# Patient Record
Sex: FEMALE | Race: WHITE | NOT HISPANIC OR LATINO | URBAN - METROPOLITAN AREA
[De-identification: names, ages, dates, MRNs, and addresses within clinical notes are randomized per-mention and may not be internally consistent; named-entity substitution may affect disease eponyms.]

---

## 2017-01-03 ENCOUNTER — OUTPATIENT (OUTPATIENT)
Dept: OUTPATIENT SERVICES | Facility: HOSPITAL | Age: 72
LOS: 1 days | Discharge: HOME | End: 2017-01-03

## 2017-06-27 DIAGNOSIS — Z12.31 ENCOUNTER FOR SCREENING MAMMOGRAM FOR MALIGNANT NEOPLASM OF BREAST: ICD-10-CM

## 2017-07-10 ENCOUNTER — OUTPATIENT (OUTPATIENT)
Dept: OUTPATIENT SERVICES | Facility: HOSPITAL | Age: 72
LOS: 1 days | Discharge: HOME | End: 2017-07-10

## 2017-07-14 ENCOUNTER — OUTPATIENT (OUTPATIENT)
Dept: OUTPATIENT SERVICES | Facility: HOSPITAL | Age: 72
LOS: 1 days | Discharge: HOME | End: 2017-07-14

## 2017-07-14 DIAGNOSIS — R10.2 PELVIC AND PERINEAL PAIN: ICD-10-CM

## 2018-01-18 ENCOUNTER — OUTPATIENT (OUTPATIENT)
Dept: OUTPATIENT SERVICES | Facility: HOSPITAL | Age: 73
LOS: 1 days | Discharge: HOME | End: 2018-01-18

## 2018-01-18 DIAGNOSIS — Z12.31 ENCOUNTER FOR SCREENING MAMMOGRAM FOR MALIGNANT NEOPLASM OF BREAST: ICD-10-CM

## 2018-07-24 ENCOUNTER — APPOINTMENT (OUTPATIENT)
Dept: OBGYN | Facility: CLINIC | Age: 73
End: 2018-07-24
Payer: MEDICARE

## 2018-07-24 ENCOUNTER — OUTPATIENT (OUTPATIENT)
Dept: OUTPATIENT SERVICES | Facility: HOSPITAL | Age: 73
LOS: 1 days | Discharge: HOME | End: 2018-07-24

## 2018-07-24 VITALS
DIASTOLIC BLOOD PRESSURE: 70 MMHG | HEIGHT: 62 IN | BODY MASS INDEX: 25.4 KG/M2 | SYSTOLIC BLOOD PRESSURE: 130 MMHG | WEIGHT: 138 LBS

## 2018-07-24 DIAGNOSIS — L65.9 NONSCARRING HAIR LOSS, UNSPECIFIED: ICD-10-CM

## 2018-07-24 DIAGNOSIS — Z01.419 ENCOUNTER FOR GYNECOLOGICAL EXAMINATION (GENERAL) (ROUTINE) WITHOUT ABNORMAL FINDINGS: ICD-10-CM

## 2018-07-24 DIAGNOSIS — Z80.0 FAMILY HISTORY OF MALIGNANT NEOPLASM OF DIGESTIVE ORGANS: ICD-10-CM

## 2018-07-24 DIAGNOSIS — Z87.39 PERSONAL HISTORY OF OTHER DISEASES OF THE MUSCULOSKELETAL SYSTEM AND CONNECTIVE TISSUE: ICD-10-CM

## 2018-07-24 DIAGNOSIS — K52.9 NONINFECTIVE GASTROENTERITIS AND COLITIS, UNSPECIFIED: ICD-10-CM

## 2018-07-24 DIAGNOSIS — Z85.828 PERSONAL HISTORY OF OTHER MALIGNANT NEOPLASM OF SKIN: ICD-10-CM

## 2018-07-24 LAB
BILIRUB UR QL STRIP: NORMAL
GLUCOSE UR-MCNC: NORMAL
HCG UR QL: 0.2 EU/DL
HGB UR QL STRIP.AUTO: NORMAL
KETONES UR-MCNC: NORMAL
LEUKOCYTE ESTERASE UR QL STRIP: NORMAL
NITRITE UR QL STRIP: NORMAL
PH UR STRIP: 6
PROT UR STRIP-MCNC: NORMAL
SP GR UR STRIP: 1.02

## 2018-07-24 PROCEDURE — 81003 URINALYSIS AUTO W/O SCOPE: CPT | Mod: QW

## 2018-07-24 PROCEDURE — G0101: CPT

## 2018-07-28 LAB — HPV HIGH+LOW RISK DNA PNL CVX: NOT DETECTED

## 2018-08-27 ENCOUNTER — APPOINTMENT (OUTPATIENT)
Dept: OBGYN | Facility: CLINIC | Age: 73
End: 2018-08-27
Payer: MEDICARE

## 2018-08-27 PROCEDURE — 77085 DXA BONE DENSITY AXL VRT FX: CPT

## 2018-09-04 ENCOUNTER — RESULT REVIEW (OUTPATIENT)
Age: 73
End: 2018-09-04

## 2019-02-03 ENCOUNTER — FORM ENCOUNTER (OUTPATIENT)
Age: 74
End: 2019-02-03

## 2019-02-04 ENCOUNTER — OUTPATIENT (OUTPATIENT)
Dept: OUTPATIENT SERVICES | Facility: HOSPITAL | Age: 74
LOS: 1 days | Discharge: HOME | End: 2019-02-04

## 2019-02-04 DIAGNOSIS — Z12.31 ENCOUNTER FOR SCREENING MAMMOGRAM FOR MALIGNANT NEOPLASM OF BREAST: ICD-10-CM

## 2019-09-09 ENCOUNTER — APPOINTMENT (OUTPATIENT)
Dept: OBGYN | Facility: CLINIC | Age: 74
End: 2019-09-09
Payer: MEDICARE

## 2019-09-09 VITALS — BODY MASS INDEX: 25.06 KG/M2 | DIASTOLIC BLOOD PRESSURE: 80 MMHG | WEIGHT: 137 LBS | SYSTOLIC BLOOD PRESSURE: 120 MMHG

## 2019-09-09 LAB
BILIRUB UR QL STRIP: NORMAL
GLUCOSE UR-MCNC: NORMAL
HCG UR QL: NORMAL EU/DL
HGB UR QL STRIP.AUTO: NORMAL
KETONES UR-MCNC: NORMAL
LEUKOCYTE ESTERASE UR QL STRIP: 75
NITRITE UR QL STRIP: NORMAL
PH UR STRIP: 5
PROT UR STRIP-MCNC: NORMAL
SP GR UR STRIP: 1.03

## 2019-09-09 PROCEDURE — 99213 OFFICE O/P EST LOW 20 MIN: CPT

## 2019-09-09 PROCEDURE — 81003 URINALYSIS AUTO W/O SCOPE: CPT | Mod: QW

## 2019-09-09 RX ORDER — DUTASTERIDE 0.5 MG/1
0.5 CAPSULE, LIQUID FILLED ORAL
Refills: 0 | Status: COMPLETED | COMMUNITY
End: 2019-09-09

## 2019-09-09 NOTE — PHYSICAL EXAM
[Awake] : awake [Alert] : alert [Soft] : soft [Oriented x3] : oriented to person, place, and time [Normal] : uterus [No Bleeding] : there was no active vaginal bleeding [Uterine Adnexae] : were not tender and not enlarged [Acute Distress] : no acute distress [Thyroid Nodule] : no thyroid nodule [LAD] : no lymphadenopathy [Mass] : no breast mass [Goiter] : no goiter [Nipple Discharge] : no nipple discharge [Axillary LAD] : no axillary lymphadenopathy [Tender] : non tender [Distended] : not distended [Depressed Mood] : not depressed [Atrophy] : atrophy [RRR, No Murmurs] : RRR, no murmurs [CTAB] : CTAB

## 2019-10-14 ENCOUNTER — APPOINTMENT (OUTPATIENT)
Dept: CARDIOLOGY | Facility: CLINIC | Age: 74
End: 2019-10-14
Payer: MEDICARE

## 2019-10-14 PROCEDURE — 93000 ELECTROCARDIOGRAM COMPLETE: CPT

## 2019-10-14 PROCEDURE — 99213 OFFICE O/P EST LOW 20 MIN: CPT

## 2019-11-04 ENCOUNTER — OUTPATIENT (OUTPATIENT)
Dept: OUTPATIENT SERVICES | Facility: HOSPITAL | Age: 74
LOS: 1 days | Discharge: HOME | End: 2019-11-04

## 2019-11-04 ENCOUNTER — TRANSCRIPTION ENCOUNTER (OUTPATIENT)
Age: 74
End: 2019-11-04

## 2019-11-04 VITALS — HEIGHT: 62 IN | WEIGHT: 134.92 LBS

## 2019-11-04 VITALS — SYSTOLIC BLOOD PRESSURE: 124 MMHG | DIASTOLIC BLOOD PRESSURE: 62 MMHG | RESPIRATION RATE: 18 BRPM | HEART RATE: 56 BPM

## 2019-11-04 DIAGNOSIS — Z90.49 ACQUIRED ABSENCE OF OTHER SPECIFIED PARTS OF DIGESTIVE TRACT: Chronic | ICD-10-CM

## 2019-11-04 DIAGNOSIS — Z98.891 HISTORY OF UTERINE SCAR FROM PREVIOUS SURGERY: Chronic | ICD-10-CM

## 2019-11-08 DIAGNOSIS — K64.8 OTHER HEMORRHOIDS: ICD-10-CM

## 2019-11-08 DIAGNOSIS — Z12.11 ENCOUNTER FOR SCREENING FOR MALIGNANT NEOPLASM OF COLON: ICD-10-CM

## 2019-11-08 DIAGNOSIS — Z88.2 ALLERGY STATUS TO SULFONAMIDES: ICD-10-CM

## 2019-11-08 DIAGNOSIS — Z88.1 ALLERGY STATUS TO OTHER ANTIBIOTIC AGENTS STATUS: ICD-10-CM

## 2019-11-08 DIAGNOSIS — K57.30 DIVERTICULOSIS OF LARGE INTESTINE WITHOUT PERFORATION OR ABSCESS WITHOUT BLEEDING: ICD-10-CM

## 2019-11-11 PROBLEM — I47.1 SUPRAVENTRICULAR TACHYCARDIA: Chronic | Status: ACTIVE | Noted: 2019-11-04

## 2019-12-06 ENCOUNTER — OUTPATIENT (OUTPATIENT)
Dept: OUTPATIENT SERVICES | Facility: HOSPITAL | Age: 74
LOS: 1 days | Discharge: HOME | End: 2019-12-06

## 2019-12-06 VITALS
OXYGEN SATURATION: 98 % | SYSTOLIC BLOOD PRESSURE: 161 MMHG | HEART RATE: 49 BPM | TEMPERATURE: 98 F | DIASTOLIC BLOOD PRESSURE: 67 MMHG | WEIGHT: 136.03 LBS | RESPIRATION RATE: 18 BRPM | HEIGHT: 62 IN

## 2019-12-06 VITALS
HEART RATE: 54 BPM | OXYGEN SATURATION: 99 % | SYSTOLIC BLOOD PRESSURE: 152 MMHG | RESPIRATION RATE: 18 BRPM | DIASTOLIC BLOOD PRESSURE: 68 MMHG

## 2019-12-06 DIAGNOSIS — Z98.891 HISTORY OF UTERINE SCAR FROM PREVIOUS SURGERY: Chronic | ICD-10-CM

## 2019-12-06 DIAGNOSIS — Z90.49 ACQUIRED ABSENCE OF OTHER SPECIFIED PARTS OF DIGESTIVE TRACT: Chronic | ICD-10-CM

## 2019-12-06 NOTE — ASU DISCHARGE PLAN (ADULT/PEDIATRIC) - PATIENT EDUCATION MATERIALS PROVIED
Provider pre-printed instructions given/This discharge document not given to pt a/p Dr Reilly.  Please see paper chart for pre-printed discharge instructions a/p Dr Reilly.

## 2019-12-10 DIAGNOSIS — Z88.8 ALLERGY STATUS TO OTHER DRUGS, MEDICAMENTS AND BIOLOGICAL SUBSTANCES: ICD-10-CM

## 2019-12-10 DIAGNOSIS — Z88.2 ALLERGY STATUS TO SULFONAMIDES: ICD-10-CM

## 2019-12-10 DIAGNOSIS — H25.11 AGE-RELATED NUCLEAR CATARACT, RIGHT EYE: ICD-10-CM

## 2019-12-10 DIAGNOSIS — H52.201 UNSPECIFIED ASTIGMATISM, RIGHT EYE: ICD-10-CM

## 2019-12-10 DIAGNOSIS — Z79.82 LONG TERM (CURRENT) USE OF ASPIRIN: ICD-10-CM

## 2019-12-13 ENCOUNTER — OUTPATIENT (OUTPATIENT)
Dept: OUTPATIENT SERVICES | Facility: HOSPITAL | Age: 74
LOS: 1 days | Discharge: HOME | End: 2019-12-13

## 2019-12-13 VITALS
RESPIRATION RATE: 17 BRPM | SYSTOLIC BLOOD PRESSURE: 168 MMHG | HEART RATE: 58 BPM | DIASTOLIC BLOOD PRESSURE: 65 MMHG | OXYGEN SATURATION: 99 %

## 2019-12-13 VITALS
RESPIRATION RATE: 17 BRPM | DIASTOLIC BLOOD PRESSURE: 63 MMHG | WEIGHT: 134.92 LBS | TEMPERATURE: 96 F | HEART RATE: 58 BPM | HEIGHT: 62 IN | SYSTOLIC BLOOD PRESSURE: 169 MMHG | OXYGEN SATURATION: 97 %

## 2019-12-13 DIAGNOSIS — Z98.891 HISTORY OF UTERINE SCAR FROM PREVIOUS SURGERY: Chronic | ICD-10-CM

## 2019-12-13 DIAGNOSIS — Z90.49 ACQUIRED ABSENCE OF OTHER SPECIFIED PARTS OF DIGESTIVE TRACT: Chronic | ICD-10-CM

## 2019-12-13 DIAGNOSIS — Z96.1 PRESENCE OF INTRAOCULAR LENS: Chronic | ICD-10-CM

## 2019-12-13 RX ORDER — ACETAMINOPHEN 500 MG
500 TABLET ORAL ONCE
Refills: 0 | Status: DISCONTINUED | OUTPATIENT
Start: 2019-12-13 | End: 2019-12-28

## 2019-12-13 RX ORDER — ONDANSETRON 8 MG/1
4 TABLET, FILM COATED ORAL ONCE
Refills: 0 | Status: DISCONTINUED | OUTPATIENT
Start: 2019-12-13 | End: 2019-12-28

## 2019-12-20 DIAGNOSIS — I47.1 SUPRAVENTRICULAR TACHYCARDIA: ICD-10-CM

## 2019-12-20 DIAGNOSIS — Z88.1 ALLERGY STATUS TO OTHER ANTIBIOTIC AGENTS STATUS: ICD-10-CM

## 2019-12-20 DIAGNOSIS — Z88.2 ALLERGY STATUS TO SULFONAMIDES: ICD-10-CM

## 2019-12-20 DIAGNOSIS — Z90.49 ACQUIRED ABSENCE OF OTHER SPECIFIED PARTS OF DIGESTIVE TRACT: ICD-10-CM

## 2019-12-20 DIAGNOSIS — H25.89 OTHER AGE-RELATED CATARACT: ICD-10-CM

## 2020-01-20 NOTE — ASU PREOP CHECKLIST - TO WHOM
----- Message from Araceli Reeves MD sent at 1/20/2020  4:30 PM CST -----  Still waiting to see if cx done   kosta mauro

## 2020-06-10 ENCOUNTER — RESULT REVIEW (OUTPATIENT)
Age: 75
End: 2020-06-10

## 2020-06-10 ENCOUNTER — OUTPATIENT (OUTPATIENT)
Dept: OUTPATIENT SERVICES | Facility: HOSPITAL | Age: 75
LOS: 1 days | Discharge: HOME | End: 2020-06-10
Payer: MEDICARE

## 2020-06-10 DIAGNOSIS — Z90.49 ACQUIRED ABSENCE OF OTHER SPECIFIED PARTS OF DIGESTIVE TRACT: Chronic | ICD-10-CM

## 2020-06-10 DIAGNOSIS — Z98.891 HISTORY OF UTERINE SCAR FROM PREVIOUS SURGERY: Chronic | ICD-10-CM

## 2020-06-10 DIAGNOSIS — Z12.31 ENCOUNTER FOR SCREENING MAMMOGRAM FOR MALIGNANT NEOPLASM OF BREAST: ICD-10-CM

## 2020-06-10 DIAGNOSIS — Z96.1 PRESENCE OF INTRAOCULAR LENS: Chronic | ICD-10-CM

## 2020-06-10 PROCEDURE — 77063 BREAST TOMOSYNTHESIS BI: CPT | Mod: 26

## 2020-06-10 PROCEDURE — 77067 SCR MAMMO BI INCL CAD: CPT | Mod: 26

## 2020-09-16 ENCOUNTER — RECORD ABSTRACTING (OUTPATIENT)
Age: 75
End: 2020-09-16

## 2020-09-16 DIAGNOSIS — Z87.891 PERSONAL HISTORY OF NICOTINE DEPENDENCE: ICD-10-CM

## 2020-09-16 DIAGNOSIS — R07.89 OTHER CHEST PAIN: ICD-10-CM

## 2020-09-16 NOTE — ASU PATIENT PROFILE, ADULT - URINARY CATHETER
Subjective:      Patient ID: Iza Maurice is a 36 y.o. female. Abdominal Pain   This is a new problem. The current episode started yesterday. The onset quality is gradual. The problem occurs intermittently. The problem has been gradually worsening. The pain is located in the RLQ and suprapubic region. The pain is at a severity of 4/10. The pain is moderate. The quality of the pain is aching. The abdominal pain radiates to the back. Associated symptoms include frequency. Nothing aggravates the pain. The pain is relieved by nothing. feels like she when she had uti last time last year  No burning or urgency  Just finished period    Review of Systems   Constitutional: Positive for activity change and fatigue. Gastrointestinal: Positive for abdominal pain. Genitourinary: Positive for frequency. YOB: 1989    Date of Visit:  9/16/2020    No Known Allergies    Outpatient Medications Marked as Taking for the 9/16/20 encounter (Office Visit) with Tori López, DO   Medication Sig Dispense Refill    amoxicillin (AMOXIL) 500 MG capsule Take 1 capsule by mouth 3 times daily for 7 days 21 capsule 0    diclofenac (VOLTAREN) 50 MG EC tablet Take 1 tablet by mouth 3 times daily as needed for Pain 21 tablet 0    desvenlafaxine succinate (PRISTIQ) 50 MG TB24 extended release tablet Take 1 tablet by mouth daily 30 tablet 3    desvenlafaxine succinate (PRISTIQ) 25 MG TB24 extended release tablet Take 1 tablet by mouth daily 30 tablet 2    B Complex-Folic Acid (B COMPLEX PLUS PO)       Omega-3 Fatty Acids (FISH OIL) 1000 MG CAPS       etonogestrel-ethinyl estradiol (NUVARING) 0.12-0.015 MG/24HR vaginal ring insert 1 vaginal ring by vaginal route once a month leave in place for 3 weeks, remove for 1 week         Vitals:    09/16/20 1350   BP: 124/74   Weight: 144 lb (65.3 kg)   Height: 5' 6\" (1.676 m)     Body mass index is 23.24 kg/m².      Wt Readings from Last 3 Encounters:   09/16/20 144 lb (65.3 kg)   07/13/20 147 lb 12.8 oz (67 kg)   01/24/20 163 lb (73.9 kg)     BP Readings from Last 3 Encounters:   09/16/20 124/74   07/13/20 139/75   01/24/20 112/80         Objective:   Physical Exam  Vitals signs and nursing note reviewed. Constitutional:       Appearance: She is well-developed. HENT:      Head: Normocephalic. Neck:      Thyroid: No thyromegaly. Cardiovascular:      Rate and Rhythm: Normal rate and regular rhythm. Heart sounds: Normal heart sounds. Pulmonary:      Effort: Pulmonary effort is normal.      Breath sounds: Normal breath sounds. Abdominal:      General: There is no distension. Palpations: Abdomen is soft. There is no mass. Tenderness: There is abdominal tenderness (diffuse right lower quad and suprapubic). There is no guarding or rebound. Lymphadenopathy:      Cervical: No cervical adenopathy. Skin:     General: Skin is warm and dry. Neurological:      Mental Status: She is alert and oriented to person, place, and time. Psychiatric:         Behavior: Behavior normal.         Thought Content: Thought content normal.         Judgment: Judgment normal.         Assessment:      Assessment/plan;  Edward Roque was seen today for abdominal pain. Diagnoses and all orders for this visit:    Acute cystitis without hematuria    YAW (generalized anxiety disorder)  -     desvenlafaxine succinate (PRISTIQ) 25 MG TB24 extended release tablet; Take 1 tablet by mouth daily    Other orders  -     amoxicillin (AMOXIL) 500 MG capsule; Take 1 capsule by mouth 3 times daily for 7 days  -     diclofenac (VOLTAREN) 50 MG EC tablet; Take 1 tablet by mouth 3 times daily as needed for Pain  -     desvenlafaxine succinate (PRISTIQ) 50 MG TB24 extended release tablet;  Take 1 tablet by mouth daily    discussed appendicitis and symptoms  Will go to er if increasing pain, nausea, fever or chills   Mary Daley,  no

## 2020-10-06 ENCOUNTER — APPOINTMENT (OUTPATIENT)
Dept: OBGYN | Facility: CLINIC | Age: 75
End: 2020-10-06
Payer: MEDICARE

## 2020-10-06 ENCOUNTER — RESULT CHARGE (OUTPATIENT)
Age: 75
End: 2020-10-06

## 2020-10-06 VITALS
HEIGHT: 62 IN | DIASTOLIC BLOOD PRESSURE: 70 MMHG | WEIGHT: 125 LBS | TEMPERATURE: 97.8 F | BODY MASS INDEX: 23 KG/M2 | SYSTOLIC BLOOD PRESSURE: 110 MMHG

## 2020-10-06 DIAGNOSIS — H04.123 DRY EYE SYNDROME OF BILATERAL LACRIMAL GLANDS: ICD-10-CM

## 2020-10-06 LAB
BILIRUB UR QL STRIP: NORMAL
GLUCOSE UR-MCNC: NORMAL
HCG UR QL: 0.2 EU/DL
HGB UR QL STRIP.AUTO: NORMAL
KETONES UR-MCNC: NORMAL
LEUKOCYTE ESTERASE UR QL STRIP: NORMAL
NITRITE UR QL STRIP: NORMAL
PH UR STRIP: 5.5
PROT UR STRIP-MCNC: NORMAL
SP GR UR STRIP: 1.03

## 2020-10-06 PROCEDURE — G0101: CPT

## 2020-10-06 NOTE — PHYSICAL EXAM
[Appropriately responsive] : appropriately responsive [Alert] : alert [No Acute Distress] : no acute distress [No Lymphadenopathy] : no lymphadenopathy [Regular Rate Rhythm] : regular rate rhythm [No Murmurs] : no murmurs [Clear to Auscultation B/L] : clear to auscultation bilaterally [Soft] : soft [Non-tender] : non-tender [Non-distended] : non-distended [No HSM] : No HSM [No Lesions] : no lesions [No Mass] : no mass [Oriented x3] : oriented x3 [Examination Of The Breasts] : a normal appearance [No Masses] : no breast masses were palpable [Labia Majora] : normal [Labia Minora] : normal [Atrophy] : atrophy [Normal] : normal [Uterine Adnexae] : normal [FreeTextEntry8] : Uterus small, anteverted, mobile and nontender. No CMT

## 2020-10-06 NOTE — HISTORY OF PRESENT ILLNESS
[Y] : Positive pregnancy history [TextBox_4] : 75yo who presents for annual GYN exam. Doing well.\par \par LMP >20 years ago. No bleeding or spotting since.\par Occasional hot flashes.\par Denies vaginal burning or dryness.\par \par Sexually active with  of 40+ years.\par Occasional dyspareunia\par Denies vaginal discharge, irritation or odor.  [Mammogramdate] : 6/2020 [TextBox_19] : normal [PapSmeardate] : 7/2018 [TextBox_31] : NILM, HPV negative [BoneDensityDate] : 8/2018 [TextBox_37] : osteopenia of hip [ColonoscopyDate] :  2019 [TextBox_43] : colitis [PGHxTotal] : 2 [[facility] CenterxFulerm] : 1 [[facility] Centeriving] : 1 [PGHxABSpont] : 1

## 2020-10-07 ENCOUNTER — TRANSCRIPTION ENCOUNTER (OUTPATIENT)
Age: 75
End: 2020-10-07

## 2020-10-12 ENCOUNTER — APPOINTMENT (OUTPATIENT)
Dept: CARDIOLOGY | Facility: CLINIC | Age: 75
End: 2020-10-12

## 2020-10-12 ENCOUNTER — APPOINTMENT (OUTPATIENT)
Dept: OBGYN | Facility: CLINIC | Age: 75
End: 2020-10-12
Payer: MEDICARE

## 2020-10-12 PROCEDURE — 77085 DXA BONE DENSITY AXL VRT FX: CPT

## 2020-10-14 ENCOUNTER — APPOINTMENT (OUTPATIENT)
Dept: CARDIOLOGY | Facility: CLINIC | Age: 75
End: 2020-10-14
Payer: MEDICARE

## 2020-10-14 VITALS
HEIGHT: 62 IN | BODY MASS INDEX: 23 KG/M2 | SYSTOLIC BLOOD PRESSURE: 150 MMHG | DIASTOLIC BLOOD PRESSURE: 70 MMHG | HEART RATE: 56 BPM | WEIGHT: 125 LBS

## 2020-10-14 DIAGNOSIS — I10 ESSENTIAL (PRIMARY) HYPERTENSION: ICD-10-CM

## 2020-10-14 PROCEDURE — 99213 OFFICE O/P EST LOW 20 MIN: CPT

## 2020-10-14 PROCEDURE — 93000 ELECTROCARDIOGRAM COMPLETE: CPT

## 2020-10-14 NOTE — PHYSICAL EXAM
[General Appearance - Well Developed] : well developed [Well Groomed] : well groomed [Normal Appearance] : normal appearance [General Appearance - In No Acute Distress] : no acute distress [General Appearance - Well Nourished] : well nourished [No Deformities] : no deformities [Normal Oral Mucosa] : normal oral mucosa [Eyelids - No Xanthelasma] : the eyelids demonstrated no xanthelasmas [Normal Conjunctiva] : the conjunctiva exhibited no abnormalities [No Oral Pallor] : no oral pallor [No Oral Cyanosis] : no oral cyanosis [Auscultation Breath Sounds / Voice Sounds] : lungs were clear to auscultation bilaterally [] : no respiratory distress [Heart Rate And Rhythm] : heart rate and rhythm were normal [Heart Sounds] : normal S1 and S2 [Arterial Pulses Normal] : the arterial pulses were normal [Edema] : no peripheral edema present [Systolic grade ___/6] : A grade [unfilled]/6 systolic murmur was heard. [Abdomen Tenderness] : non-tender [Abdomen Mass (___ Cm)] : no abdominal mass palpated [Nail Clubbing] : no clubbing of the fingernails [Cyanosis, Localized] : no localized cyanosis [Oriented To Time, Place, And Person] : oriented to person, place, and time [FreeTextEntry1] : No JVD

## 2020-10-14 NOTE — ASSESSMENT
[FreeTextEntry1] : RVOT PVC\par occas chest pressure along with diarrhea sometimes , generally not exertional and was in mountains w/o difficulty\par atypcal cp \par HDL 92 \par stress(-) 2018occas PVC and PAC with short bust svt for 4 beats , no ischemia of valvular diasese echo\par HR 48 today occas dizzy so will cut dose to 120 a da

## 2020-10-21 RX ORDER — VERAPAMIL HYDROCHLORIDE 180 MG/1
180 CAPSULE, DELAYED RELEASE PELLETS ORAL DAILY
Qty: 90 | Refills: 3 | Status: COMPLETED | COMMUNITY
End: 2020-10-21

## 2020-10-26 ENCOUNTER — APPOINTMENT (OUTPATIENT)
Dept: CARDIOLOGY | Facility: CLINIC | Age: 75
End: 2020-10-26

## 2020-11-03 NOTE — ASU PREOP CHECKLIST - PATIENT'S PERSONAL PROPERTY GIVEN TO
security/safe security/safe/13 Complex Repair And Dermal Autograft Text: The defect edges were debeveled with a #15 scalpel blade.  The primary defect was closed partially with a complex linear closure.  Given the location of the defect, shape of the defect and the proximity to free margins an dermal autograft was deemed most appropriate to repair the remaining defect.  The graft was trimmed to fit the size of the remaining defect.  The graft was then placed in the primary defect, oriented appropriately, and sutured into place.

## 2021-05-31 ENCOUNTER — RESULT REVIEW (OUTPATIENT)
Age: 76
End: 2021-05-31

## 2021-06-13 ENCOUNTER — TRANSCRIPTION ENCOUNTER (OUTPATIENT)
Age: 76
End: 2021-06-13

## 2021-06-21 ENCOUNTER — NON-APPOINTMENT (OUTPATIENT)
Age: 76
End: 2021-06-21

## 2021-06-22 ENCOUNTER — APPOINTMENT (OUTPATIENT)
Dept: PLASTIC SURGERY | Facility: CLINIC | Age: 76
End: 2021-06-22
Payer: MEDICARE

## 2021-06-22 VITALS — BODY MASS INDEX: 23.56 KG/M2 | HEIGHT: 60 IN | WEIGHT: 120 LBS

## 2021-06-22 DIAGNOSIS — Z78.9 OTHER SPECIFIED HEALTH STATUS: ICD-10-CM

## 2021-06-22 PROCEDURE — 99203 OFFICE O/P NEW LOW 30 MIN: CPT

## 2021-06-22 NOTE — HISTORY OF PRESENT ILLNESS
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 Pt states lesion wasp resent approximately 2 months prior to biopsy 4/28/21. Due to see Dr. Norton for Mohs procedure and is here to discuss reconstruction.\par \par Nonsmoker, nondiabetic

## 2021-06-22 NOTE — PHYSICAL EXAM
[de-identified] : well-appearing, NAD [de-identified] : left nasal ala with 2mm healing biopsy site

## 2021-06-22 NOTE — ASSESSMENT
[FreeTextEntry1] : 75 y/oF with newly diagnosed left nasal ala SCC\par \par as above\par to have Mohs on left nasal SCC\par \par Regarding the reconstruction after Mohs surgery, we discussed scarring, risk of repeat procedure, poor wound healing, need for additional revisionary surgery,asymmetry, dissatisfaction with the outcome, and unplanned surgery in the future.  All questions were answered.  All risks were well understood by the patient.\par \par Regarding the reconstruction after skin cancer  surgery, we discussed scarring, risk of repeat procedure, poor wound healing, need for additional revisionary surgery,asymmetry, dissatisfaction with the outcome, and unplanned surgery in the future.  All questions were answered.  All risks were well understood by the patient.\par \par Due to COVID 19, pre-visit patient instructions were explained to the patient and their symptoms were checked upon arrival.  \par Masks were used by the health care providers and staff and the examination room was cleaned after the patient visit was completed.\par \par \par

## 2021-07-21 ENCOUNTER — RESULT REVIEW (OUTPATIENT)
Age: 76
End: 2021-07-21

## 2021-07-21 ENCOUNTER — OUTPATIENT (OUTPATIENT)
Dept: OUTPATIENT SERVICES | Facility: HOSPITAL | Age: 76
LOS: 1 days | Discharge: HOME | End: 2021-07-21
Payer: MEDICARE

## 2021-07-21 VITALS
HEART RATE: 58 BPM | HEIGHT: 62 IN | DIASTOLIC BLOOD PRESSURE: 72 MMHG | WEIGHT: 119.93 LBS | TEMPERATURE: 97 F | RESPIRATION RATE: 18 BRPM | SYSTOLIC BLOOD PRESSURE: 110 MMHG | OXYGEN SATURATION: 99 %

## 2021-07-21 DIAGNOSIS — Z01.818 ENCOUNTER FOR OTHER PREPROCEDURAL EXAMINATION: ICD-10-CM

## 2021-07-21 DIAGNOSIS — C44.311 BASAL CELL CARCINOMA OF SKIN OF NOSE: ICD-10-CM

## 2021-07-21 DIAGNOSIS — Z90.49 ACQUIRED ABSENCE OF OTHER SPECIFIED PARTS OF DIGESTIVE TRACT: Chronic | ICD-10-CM

## 2021-07-21 DIAGNOSIS — Z96.1 PRESENCE OF INTRAOCULAR LENS: Chronic | ICD-10-CM

## 2021-07-21 DIAGNOSIS — Z98.891 HISTORY OF UTERINE SCAR FROM PREVIOUS SURGERY: Chronic | ICD-10-CM

## 2021-07-21 LAB
ALBUMIN SERPL ELPH-MCNC: 4.6 G/DL — SIGNIFICANT CHANGE UP (ref 3.5–5.2)
ALP SERPL-CCNC: 66 U/L — SIGNIFICANT CHANGE UP (ref 30–115)
ALT FLD-CCNC: 15 U/L — SIGNIFICANT CHANGE UP (ref 0–41)
ANION GAP SERPL CALC-SCNC: 9 MMOL/L — SIGNIFICANT CHANGE UP (ref 7–14)
APTT BLD: 28.5 SEC — SIGNIFICANT CHANGE UP (ref 27–39.2)
AST SERPL-CCNC: 21 U/L — SIGNIFICANT CHANGE UP (ref 0–41)
BASOPHILS # BLD AUTO: 0.01 K/UL — SIGNIFICANT CHANGE UP (ref 0–0.2)
BASOPHILS NFR BLD AUTO: 0.2 % — SIGNIFICANT CHANGE UP (ref 0–1)
BILIRUB SERPL-MCNC: 0.8 MG/DL — SIGNIFICANT CHANGE UP (ref 0.2–1.2)
BUN SERPL-MCNC: 18 MG/DL — SIGNIFICANT CHANGE UP (ref 10–20)
CALCIUM SERPL-MCNC: 9.3 MG/DL — SIGNIFICANT CHANGE UP (ref 8.5–10.1)
CHLORIDE SERPL-SCNC: 104 MMOL/L — SIGNIFICANT CHANGE UP (ref 98–110)
CO2 SERPL-SCNC: 27 MMOL/L — SIGNIFICANT CHANGE UP (ref 17–32)
CREAT SERPL-MCNC: 0.7 MG/DL — SIGNIFICANT CHANGE UP (ref 0.7–1.5)
EOSINOPHIL # BLD AUTO: 0.11 K/UL — SIGNIFICANT CHANGE UP (ref 0–0.7)
EOSINOPHIL NFR BLD AUTO: 2.7 % — SIGNIFICANT CHANGE UP (ref 0–8)
GLUCOSE SERPL-MCNC: 90 MG/DL — SIGNIFICANT CHANGE UP (ref 70–99)
HCT VFR BLD CALC: 40.3 % — SIGNIFICANT CHANGE UP (ref 37–47)
HGB BLD-MCNC: 12.8 G/DL — SIGNIFICANT CHANGE UP (ref 12–16)
IMM GRANULOCYTES NFR BLD AUTO: 0.2 % — SIGNIFICANT CHANGE UP (ref 0.1–0.3)
INR BLD: 0.94 RATIO — SIGNIFICANT CHANGE UP (ref 0.65–1.3)
LYMPHOCYTES # BLD AUTO: 1.02 K/UL — LOW (ref 1.2–3.4)
LYMPHOCYTES # BLD AUTO: 25 % — SIGNIFICANT CHANGE UP (ref 20.5–51.1)
MCHC RBC-ENTMCNC: 28 PG — SIGNIFICANT CHANGE UP (ref 27–31)
MCHC RBC-ENTMCNC: 31.8 G/DL — LOW (ref 32–37)
MCV RBC AUTO: 88.2 FL — SIGNIFICANT CHANGE UP (ref 81–99)
MONOCYTES # BLD AUTO: 0.42 K/UL — SIGNIFICANT CHANGE UP (ref 0.1–0.6)
MONOCYTES NFR BLD AUTO: 10.3 % — HIGH (ref 1.7–9.3)
NEUTROPHILS # BLD AUTO: 2.51 K/UL — SIGNIFICANT CHANGE UP (ref 1.4–6.5)
NEUTROPHILS NFR BLD AUTO: 61.6 % — SIGNIFICANT CHANGE UP (ref 42.2–75.2)
NRBC # BLD: 0 /100 WBCS — SIGNIFICANT CHANGE UP (ref 0–0)
PLATELET # BLD AUTO: 203 K/UL — SIGNIFICANT CHANGE UP (ref 130–400)
POTASSIUM SERPL-MCNC: 5 MMOL/L — SIGNIFICANT CHANGE UP (ref 3.5–5)
POTASSIUM SERPL-SCNC: 5 MMOL/L — SIGNIFICANT CHANGE UP (ref 3.5–5)
PROT SERPL-MCNC: 6.9 G/DL — SIGNIFICANT CHANGE UP (ref 6–8)
PROTHROM AB SERPL-ACNC: 10.8 SEC — SIGNIFICANT CHANGE UP (ref 9.95–12.87)
RBC # BLD: 4.57 M/UL — SIGNIFICANT CHANGE UP (ref 4.2–5.4)
RBC # FLD: 13.8 % — SIGNIFICANT CHANGE UP (ref 11.5–14.5)
SODIUM SERPL-SCNC: 140 MMOL/L — SIGNIFICANT CHANGE UP (ref 135–146)
WBC # BLD: 4.08 K/UL — LOW (ref 4.8–10.8)
WBC # FLD AUTO: 4.08 K/UL — LOW (ref 4.8–10.8)

## 2021-07-21 PROCEDURE — 71046 X-RAY EXAM CHEST 2 VIEWS: CPT | Mod: 26

## 2021-07-21 PROCEDURE — 93010 ELECTROCARDIOGRAM REPORT: CPT

## 2021-07-21 NOTE — H&P PST ADULT - REASON FOR ADMISSION
Patient is a 75 year old  female presenting to PAST in preparation for    LEFT NASAL RECONSTRUCTION FLAP on   8/11/2021 under general anesthesia by Dr. martinez

## 2021-07-21 NOTE — H&P PST ADULT - HISTORY OF PRESENT ILLNESS
pt having mohs procedure 8/10/2021 left nare for squamous cell skin ca   and planned for dr martinez procedure  day    PATIENT CURRENTLY DENIES CHEST PAIN  SHORTNESS OF BREATH  PALPITATIONS,  DYSURIA,   pt with cold s/s almost resolved   EXERCISE  TOLERANCE  1-2 FLIGHT OF STAIRS  WITHOUT SHORTNESS OF BREATH  2 doses moderna   denies travel outside the [place] in the past 30 days  Patient denies any signs or symptoms of COVID 19 and denies contact with known positive individuals.  They have an appointment for repeat COVID testing pre-procedure and acknowledge its time and place.  They were instructed to quarantine pre-procedure, practice exposure control measures, continue to self-monitor and report any concerns to their proceduralist.  pt advised self quarantine till day of procedure  Anesthesia Alert  NO--Difficult Airway  NO--History of neck surgery or radiation  NO--Limited ROM of neck  NO--History of Malignant hyperthermia  NO--No personal or family history of Pseudocholinesterase deficiency.  NO--Prior Anesthesia Complication  NO--Latex Allergy  NO--Loose teeth  NO--History of Rheumatoid Arthritis  NO--Bleeding risk  NO--JUSTYNA  NO--Other_____  C44.311/ 1406    Basal cell carcinoma (BCC) of skin of nose    Encounter for other preprocedural examination    ^C44.311/ 1406    Basal cell carcinoma (BCC) of skin of nose    Encounter for other preprocedural examination    SVT (supraventricular tachycardia)    History of cholecystectomy    H/O:     History of intraocular lens implant      PT DENIES ANY RASHES, ABRASION, OR OPEN WOUNDS OR CUTS    AS PER THE PT, THIS IS HIS/HER COMPLETE MEDICAL AND SURGICAL HX, INCLUDING MEDICATIONS PRESCRIBED AND OVER THE COUNTER    Patient verbalized understanding of instructions and was given the opportunity to ask questions and have them answered.

## 2021-07-21 NOTE — H&P PST ADULT - NSICDXPASTMEDICALHX_GEN_ALL_CORE_FT
PAST MEDICAL HISTORY:  History of Mohs surgery for squamous cell carcinoma of skin     SVT (supraventricular tachycardia)

## 2021-07-21 NOTE — H&P PST ADULT - NSICDXPASTSURGICALHX_GEN_ALL_CORE_FT
PAST SURGICAL HISTORY:  H/O:      History of cholecystectomy     History of intraocular lens implant b/l

## 2021-07-21 NOTE — H&P PST ADULT - NSANTHOSAYNRD_GEN_A_CORE
No. JUSTYNA screening performed.  STOP BANG Legend: 0-2 = LOW Risk; 3-4 = INTERMEDIATE Risk; 5-8 = HIGH Risk

## 2021-07-28 NOTE — PHYSICAL EXAM
[General Appearance - Well Developed] : well developed [Normal Appearance] : normal appearance [Well Groomed] : well groomed [General Appearance - Well Nourished] : well nourished [No Deformities] : no deformities [General Appearance - In No Acute Distress] : no acute distress [Normal Conjunctiva] : the conjunctiva exhibited no abnormalities [Eyelids - No Xanthelasma] : the eyelids demonstrated no xanthelasmas [Normal Oral Mucosa] : normal oral mucosa [No Oral Pallor] : no oral pallor [No Oral Cyanosis] : no oral cyanosis [FreeTextEntry1] : No JVD [] : no respiratory distress [Auscultation Breath Sounds / Voice Sounds] : lungs were clear to auscultation bilaterally [Heart Rate And Rhythm] : heart rate and rhythm were normal [Heart Sounds] : normal S1 and S2 [Arterial Pulses Normal] : the arterial pulses were normal [Edema] : no peripheral edema present [Systolic grade ___/6] : A grade [unfilled]/6 systolic murmur was heard. [Abdomen Tenderness] : non-tender [Abdomen Mass (___ Cm)] : no abdominal mass palpated [Nail Clubbing] : no clubbing of the fingernails [Cyanosis, Localized] : no localized cyanosis [Oriented To Time, Place, And Person] : oriented to person, place, and time

## 2021-07-30 ENCOUNTER — APPOINTMENT (OUTPATIENT)
Dept: CARDIOLOGY | Facility: CLINIC | Age: 76
End: 2021-07-30

## 2021-08-06 RX ORDER — VERAPAMIL HYDROCHLORIDE 120 MG/1
120 TABLET ORAL
Qty: 90 | Refills: 3 | Status: DISCONTINUED | COMMUNITY
Start: 2020-10-14 | End: 2021-08-06

## 2021-08-10 ENCOUNTER — APPOINTMENT (OUTPATIENT)
Dept: PLASTIC SURGERY | Facility: CLINIC | Age: 76
End: 2021-08-10
Payer: MEDICARE

## 2021-08-10 PROCEDURE — 99212 OFFICE O/P EST SF 10 MIN: CPT | Mod: 57

## 2021-08-10 NOTE — ASU PATIENT PROFILE, ADULT - AS SC BRADEN FRICTION
Referred by: Lauren Franker, CNP; Medical Diagnosis (from order):    Diagnosis Information      Diagnosis    722.90 (ICD-9-CM) - M46.40 (ICD-10-CM) - Discitis    V45.89 (ICD-9-CM) - Z98.890 (ICD-10-CM) - Status post surgery                Physical Therapy -  Daily Treatment Note    Visit:  22     SUBJECTIVE                                                                                                             Patient reports that on Sunday she felt a lot of pain in her right leg and had to use her walker for a couple of days.  She states that her leg was giving out and she felt her pain was below and above her knee and sometimes up near the hip.  She reports that she still has pain but it has improved.  She is using her cane to walk again.  She notes some back stiffness today.        OBJECTIVE                                                                                                                      Strength  (out of 5 unless noted, standard test position unless noted, lbs tested with hand held dynamometer)   Hip:    - Flexion:        • Left: 5        • Right: 4+    - External Rotation:        • Left: 5        • Right: 4+  Knee:    - Flexion:        • Left: 5        • Right: 4+    - Extension:        • Left: 5        • Right: 4+       TREATMENT                                                                                                                  Therapeutic Exercise:  Sidelying hip abduction 2 x 10  TA isometric with march x 20  Hooklying trunk rotation x 10 - limited ROM  TA isometric with overhead ball swing in [facility] 4.4# ball 2 x 10  Long arc quad x 15   Seated hip external rotation green theraband x 15 - d/c next  Seated hamstring curl green theraband x 15 - d/c next  Defer due to RLE fatigue:  Calf raise x 20   Standing hip abduction and extension orange theraband x 15 each  Sidesteps at ballet bar orange theraband x 2 laps  TKE with orange theraband x 15  Bike x 6 min     Precautions:  No repetitive deep bending or twisting. Lifting limited to 20-40 lbs    Therapeutic Activity:  Sit to stand from raised table (21 inch height) 2 x 10  8 inch step ups with bilateral hand rails x 15 each leg   Mini squat x 15  Randall walk over in parallel bars x 10 reps each leg - defer  Bridge 2 x 10  6 inch reciprocal stair negotiation x 3  Squat to lift 5# dumbbell from 8 inch step x 10      Neuromuscular Re-Education:  Airex march x 1 min  Standing on airex with feet together eyes open x 15 sec, eyes closed x 15 sec  Single leg stance with geomat taps x 5 rounds each leg  Tandem balance 2 x 15 sec  Tiltboard x 20 each way  New:  Standing on airex with horizontal and vertical head turns x 10 each way    Only 1:1 skilled  therapy time billed     Skilled input: verbal instruction/cues    Writer verbally educated and received verbal consent for hand placement, positioning of patient, and techniques to be performed today from patient for clothing adjustments for techniques and hand placement and palpation for techniques as described above and how they are pertinent to the patient's plan of care.    Home Exercise Program: Access Code: [email or web address]   URL: [email or web address]   Date: 10/05/2020   Prepared by: Gisela Garrison     Exercises  Hooklying Single Knee to Chest Stretch - 2 reps - 1 sets - 20 second hold - 1x daily - 7x weekly  Supine Hamstring Stretch - 2 reps - 1 sets - 20 second hold - 1x daily - 7x weekly  Supine Hip External Rotation Stretch - 2 reps - 1 sets - 20 sec hold - 1x daily - 7x weekly  Seated Figure 4 Piriformis Stretch - 2 reps - 1 sets - 20 second hold - 1x daily - 7x weekly  Standing Hamstring Stretch with Step - 2 reps - 1 sets - 20 second hold - 1x daily - 7x weekly  Standing ITB Stretch - 2 reps - 1 sets - 20 second hold - 1x daily - 7x weekly  Hooklying Clamshell with Resistance - 20 reps - 1 sets - 1x daily - 7x weekly  Supine Hip Adduction Isometric with Ball - 20  reps - 1 sets - 3 second hold - 1x daily - 7x weekly  Heel rises with counter support - 15 reps - 1 sets - 1x daily - 7x weekly  Standing Hip Abduction with Counter Support - 10 reps - 1 sets - 1x daily - 7x weekly     ASSESSMENT                                                                                                             Patient presents with improved strength of bilateral lower extremities  She ambulates today with decreased stance on right lower extremity with use of cane.  Due to increased leg pain, patient was instructed to follow up with her physician.  She had increased fatigue throughout session which limited exercise progression.  Progressed squatting exercises to include lifting of 5# weight from box for progression towards safe lifting of cooking supplies at home.  Patient was able to maintain balance on airex with contact guard for safety.              Procedures and total treatment time documented Time Entry flowsheet.   (3) no apparent problem

## 2021-08-10 NOTE — ASSESSMENT
[FreeTextEntry1] : had Mohs tdoay--two times\par examined wound and discussed w pt and \par \par she may requrie more than one surgery and she may require cartilage graft as small amount of left alar rim is missing\par \par HOWEVER\par if need cartilage would not be at tomorrow's operation\par \par Regarding the reconstruction after Mohs surgery, we discussed scarring, risk of repeat procedure, poor wound healing, need for additional revisionary surgery,asymmetry, dissatisfaction with the outcome, and unplanned surgery in the future.  All questions were answered.  All risks were well understood by the patient.\par \par Regarding the reconstruction after skin cancer  surgery, we discussed scarring, risk of repeat procedure, poor wound healing, need for additional revisionary surgery,asymmetry, dissatisfaction with the outcome, and unplanned surgery in the future.  All questions were answered.  All risks were well understood by the patient.\par \par Regarding the procedure, we discussed scarring, poor wound healing, bleeding, infection, need for additional surgery, and dissatisfaction with the outcome.  Also discussed possibility of keloid and/or hypertrophic scar formation as well as recurrence.  All questions were answered and risks understood.\par \par Photos taken with patient permission.\par \par All ?s answered\par

## 2021-08-11 ENCOUNTER — OUTPATIENT (OUTPATIENT)
Dept: OUTPATIENT SERVICES | Facility: HOSPITAL | Age: 76
LOS: 1 days | Discharge: HOME | End: 2021-08-11

## 2021-08-11 ENCOUNTER — APPOINTMENT (OUTPATIENT)
Dept: PLASTIC SURGERY | Facility: AMBULATORY SURGERY CENTER | Age: 76
End: 2021-08-11
Payer: MEDICARE

## 2021-08-11 VITALS
TEMPERATURE: 98 F | HEIGHT: 62 IN | WEIGHT: 119.93 LBS | HEART RATE: 54 BPM | DIASTOLIC BLOOD PRESSURE: 79 MMHG | OXYGEN SATURATION: 99 % | SYSTOLIC BLOOD PRESSURE: 135 MMHG | RESPIRATION RATE: 18 BRPM

## 2021-08-11 VITALS
RESPIRATION RATE: 13 BRPM | DIASTOLIC BLOOD PRESSURE: 71 MMHG | HEART RATE: 63 BPM | OXYGEN SATURATION: 99 % | SYSTOLIC BLOOD PRESSURE: 137 MMHG

## 2021-08-11 DIAGNOSIS — Z98.891 HISTORY OF UTERINE SCAR FROM PREVIOUS SURGERY: Chronic | ICD-10-CM

## 2021-08-11 DIAGNOSIS — Z90.49 ACQUIRED ABSENCE OF OTHER SPECIFIED PARTS OF DIGESTIVE TRACT: Chronic | ICD-10-CM

## 2021-08-11 DIAGNOSIS — Z96.1 PRESENCE OF INTRAOCULAR LENS: Chronic | ICD-10-CM

## 2021-08-11 PROCEDURE — 14060 TIS TRNFR E/N/E/L 10 SQ CM/<: CPT

## 2021-08-11 RX ORDER — PYRIDOXINE HCL (VITAMIN B6) 100 MG
0 TABLET ORAL
Qty: 0 | Refills: 0 | DISCHARGE

## 2021-08-11 RX ORDER — ONDANSETRON 8 MG/1
4 TABLET, FILM COATED ORAL ONCE
Refills: 0 | Status: DISCONTINUED | OUTPATIENT
Start: 2021-08-11 | End: 2021-08-25

## 2021-08-11 RX ORDER — TRAMADOL HYDROCHLORIDE 50 MG/1
1 TABLET ORAL
Qty: 10 | Refills: 0
Start: 2021-08-11

## 2021-08-11 RX ORDER — SODIUM CHLORIDE 9 MG/ML
1000 INJECTION, SOLUTION INTRAVENOUS
Refills: 0 | Status: DISCONTINUED | OUTPATIENT
Start: 2021-08-11 | End: 2021-08-25

## 2021-08-11 RX ORDER — ASPIRIN/CALCIUM CARB/MAGNESIUM 324 MG
1 TABLET ORAL
Qty: 0 | Refills: 0 | DISCHARGE

## 2021-08-11 RX ORDER — HYDROMORPHONE HYDROCHLORIDE 2 MG/ML
0.5 INJECTION INTRAMUSCULAR; INTRAVENOUS; SUBCUTANEOUS
Refills: 0 | Status: DISCONTINUED | OUTPATIENT
Start: 2021-08-11 | End: 2021-08-11

## 2021-08-11 RX ORDER — PREGABALIN 225 MG/1
0 CAPSULE ORAL
Qty: 0 | Refills: 0 | DISCHARGE

## 2021-08-11 RX ORDER — VERAPAMIL HCL 240 MG
1 CAPSULE, EXTENDED RELEASE PELLETS 24 HR ORAL
Qty: 0 | Refills: 0 | DISCHARGE

## 2021-08-11 RX ORDER — CHOLECALCIFEROL (VITAMIN D3) 125 MCG
0 CAPSULE ORAL
Qty: 0 | Refills: 0 | DISCHARGE

## 2021-08-11 RX ORDER — CEPHALEXIN 500 MG
1 CAPSULE ORAL
Qty: 12 | Refills: 0
Start: 2021-08-11 | End: 2021-08-13

## 2021-08-11 RX ORDER — TAMSULOSIN HYDROCHLORIDE 0.4 MG/1
1 CAPSULE ORAL
Qty: 0 | Refills: 0 | DISCHARGE

## 2021-08-11 NOTE — ASU DISCHARGE PLAN (ADULT/PEDIATRIC) - ASU DC SPECIAL INSTRUCTIONSFT
-Keep dressing clean and dry. Do not remove.  -Take antibiotics for 3 days as prescribed.  -Take Tylenol as needed for pain. If not well controlled, take Tramadol as needed.  -Sleep with the head of the bed elevated using pillows to help prevent swelling.  -No driving if taking pain medication.  -Bruising, swelling, and numbness are normal and are expected to improve over time.  -Call the office anytime for questions or concerns.  -Follow up in 1 week.

## 2021-08-11 NOTE — CHART NOTE - NSCHARTNOTEFT_GEN_A_CORE
PACU ANESTHESIA ADMISSION NOTE      Procedure: Reconstruction, face, post Mohs micrographic surgery  left nasal Mohs defect, using local flap      Post op diagnosis:  Mohs defect of left nose        ____  Intubated  TV:______       Rate: ______      FiO2: ______    __x__  Patent Airway    __x__  Full return of protective reflexes    __x__  Full recovery from anesthesia / back to baseline status    Vitals:  T(C): 36.8 (08-11-21 @ 07:44), Max: 36.8 (08-11-21 @ 06:41)  HR: 54 (08-11-21 @ 07:44) (54 - 54)  BP: 135/79 (08-11-21 @ 07:44) (135/79 - 135/79)  RR: 18 (08-11-21 @ 07:44) (18 - 18)  SpO2: 99% (08-11-21 @ 07:44) (99% - 99%)    Mental Status:  __x__ Awake   ___x__ Alert   _____ Drowsy   _____ Sedated    Nausea/Vomiting:  __x__ NO  ______Yes,   See Post - Op Orders          Pain Scale (0-10):  _____    Treatment: ____ None    __x__ See Post - Op/PCA Orders    Post - Operative Fluids:   ____ Oral   __x__ See Post - Op Orders    Plan: Discharge:   _X__Home       _____Floor     _____Critical Care    _____  Other:_________________    Comments: Patient had smooth intraoperative event, no anesthesia complication.  PACU Vital signs: HR:    65        BP:    147    /   69       RR:  18           O2 Sat:   100    %     Temp 98.6f

## 2021-08-11 NOTE — BRIEF OPERATIVE NOTE - NSICDXBRIEFPROCEDURE_GEN_ALL_CORE_FT
PROCEDURES:  Reconstruction, face, post Mohs micrographic surgery 11-Aug-2021 08:46:58 left nasal Mohs defect, using local flap Stacey Conner L

## 2021-08-12 ENCOUNTER — NON-APPOINTMENT (OUTPATIENT)
Age: 76
End: 2021-08-12

## 2021-08-16 DIAGNOSIS — C44.91 BASAL CELL CARCINOMA OF SKIN, UNSPECIFIED: ICD-10-CM

## 2021-08-16 DIAGNOSIS — Z88.2 ALLERGY STATUS TO SULFONAMIDES: ICD-10-CM

## 2021-08-18 ENCOUNTER — APPOINTMENT (OUTPATIENT)
Dept: PLASTIC SURGERY | Facility: CLINIC | Age: 76
End: 2021-08-18
Payer: MEDICARE

## 2021-08-18 PROBLEM — Z98.890 OTHER SPECIFIED POSTPROCEDURAL STATES: Chronic | Status: ACTIVE | Noted: 2021-07-21

## 2021-08-18 PROCEDURE — 99024 POSTOP FOLLOW-UP VISIT: CPT

## 2021-08-18 NOTE — PHYSICAL EXAM
[de-identified] : well-appearing, NAD [de-identified] : left inferior orbital swelling and bruising [de-identified] : left nasal flap healing well with small area of delayed healing at alar rim, good overall contour, suture line c/d/i, minimal swelling and bruising as expected

## 2021-08-18 NOTE — HISTORY OF PRESENT ILLNESS
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 Pt states lesion wasp resent approximately 2 months prior to biopsy 4/28/21. Due to see Dr. Norton for Mohs procedure and is here to discuss reconstruction.\par \par Nonsmoker, nondiabetic\par \par Interval hx (8/18/21). Patient presents today POD#7 s/p reconstruction of left nasal Moh's defect with LTR. Saw her ophthalmologist due to left eye pain post-op, diagnosed with corneal abrasion, used antibiotic eye drops with improvement of symptoms. Doing well otherwise. Denies any significant pain, f/c or bleeding.

## 2021-08-18 NOTE — ASSESSMENT
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 \par \par - most sutures removed\par - daily Bacitracin\par - post-op instructions discussed\par - f/u next week for removal of remaining sutures and scar management \par \par Due to COVID 19, pre-visit patient instructions were explained to the patient and their symptoms were checked upon arrival.  \par Masks were used by the health care providers and staff and the examination room was cleaned after the patient visit was completed.\par \par \par

## 2021-08-26 ENCOUNTER — APPOINTMENT (OUTPATIENT)
Dept: PLASTIC SURGERY | Facility: CLINIC | Age: 76
End: 2021-08-26
Payer: MEDICARE

## 2021-08-26 PROCEDURE — 99024 POSTOP FOLLOW-UP VISIT: CPT

## 2021-08-26 NOTE — PHYSICAL EXAM
[de-identified] : well-appearing, NAD [de-identified] : left inferior orbital swelling and bruising resolving  [de-identified] : left nasal flap healing well with small area of delayed healing at alar rim, good overall contour, suture line c/d/i, minimal swelling and bruising as expected

## 2021-08-26 NOTE — HISTORY OF PRESENT ILLNESS
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 Pt states lesion wasp resent approximately 2 months prior to biopsy 4/28/21. Due to see Dr. Norton for Mohs procedure and is here to discuss reconstruction.\par \par Nonsmoker, nondiabetic\par \par Interval hx (8/18/21). Patient presents today POD#7 s/p reconstruction of left nasal Moh's defect with LTR. Saw her ophthalmologist due to left eye pain post-op, diagnosed with corneal abrasion, used antibiotic eye drops with improvement of symptoms. Doing well otherwise. Denies any significant pain, f/c or bleeding. \par \par Interval hx (8/26/21). Patient presents today POD#15 s/p reconstruction of left nasal Moh's defect with LTR. Doing well with no significant pain, f/c or bleeding. Left eye pain and abrasion resolved.

## 2021-08-26 NOTE — ASSESSMENT
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 Doing well. \par \par - remaining sutures removed\par - daily Aquaphor\par - start Scarguard in 1-2 weeks\par - post-op instructions discussed\par - dermatology surveillance\par - f/u 2 months \par Pt was seen with Dr. Styles. \par \par Due to COVID 19, pre-visit patient instructions were explained to the patient and their symptoms were checked upon arrival.  \par Masks were used by the health care providers and staff and the examination room was cleaned after the patient visit was completed.\par \par \par

## 2021-09-01 ENCOUNTER — RESULT REVIEW (OUTPATIENT)
Age: 76
End: 2021-09-01

## 2021-09-01 ENCOUNTER — OUTPATIENT (OUTPATIENT)
Dept: OUTPATIENT SERVICES | Facility: HOSPITAL | Age: 76
LOS: 1 days | Discharge: HOME | End: 2021-09-01
Payer: MEDICARE

## 2021-09-01 DIAGNOSIS — Z90.49 ACQUIRED ABSENCE OF OTHER SPECIFIED PARTS OF DIGESTIVE TRACT: Chronic | ICD-10-CM

## 2021-09-01 DIAGNOSIS — Z96.1 PRESENCE OF INTRAOCULAR LENS: Chronic | ICD-10-CM

## 2021-09-01 DIAGNOSIS — Z98.891 HISTORY OF UTERINE SCAR FROM PREVIOUS SURGERY: Chronic | ICD-10-CM

## 2021-09-01 DIAGNOSIS — Z12.31 ENCOUNTER FOR SCREENING MAMMOGRAM FOR MALIGNANT NEOPLASM OF BREAST: ICD-10-CM

## 2021-09-01 PROCEDURE — 77063 BREAST TOMOSYNTHESIS BI: CPT | Mod: 26

## 2021-09-01 PROCEDURE — 77067 SCR MAMMO BI INCL CAD: CPT | Mod: 26

## 2021-10-12 ENCOUNTER — APPOINTMENT (OUTPATIENT)
Dept: OBGYN | Facility: CLINIC | Age: 76
End: 2021-10-12

## 2021-10-13 ENCOUNTER — APPOINTMENT (OUTPATIENT)
Dept: CARDIOLOGY | Facility: CLINIC | Age: 76
End: 2021-10-13
Payer: MEDICARE

## 2021-10-13 ENCOUNTER — RESULT CHARGE (OUTPATIENT)
Age: 76
End: 2021-10-13

## 2021-10-13 VITALS
WEIGHT: 120 LBS | HEART RATE: 49 BPM | OXYGEN SATURATION: 99 % | SYSTOLIC BLOOD PRESSURE: 100 MMHG | DIASTOLIC BLOOD PRESSURE: 70 MMHG | HEIGHT: 60 IN | BODY MASS INDEX: 23.56 KG/M2 | TEMPERATURE: 97.7 F

## 2021-10-13 PROCEDURE — 93000 ELECTROCARDIOGRAM COMPLETE: CPT

## 2021-10-13 PROCEDURE — 99213 OFFICE O/P EST LOW 20 MIN: CPT

## 2021-10-13 NOTE — ASSESSMENT
[FreeTextEntry1] : RVOT PVC(  had EP study with inducible RVOT VT with [employer] and S1 2 3; 1994  ) \par no syncope \par suppresed with Verampil and not inducible with stess testing \par Asymptomatic [place] \par HDL 92  \par stress(-) 2018 occas PVC and PAC with short bust svt for 4 beats , no ischemia of valvular disease by  echo\par can d/c asa \par

## 2021-10-13 NOTE — PHYSICAL EXAM
[General Appearance - Well Developed] : well developed [Normal Appearance] : normal appearance [Well Groomed] : well groomed [General Appearance - Well Nourished] : well nourished [No Deformities] : no deformities [Normal Conjunctiva] : the conjunctiva exhibited no abnormalities [General Appearance - In No Acute Distress] : no acute distress [Eyelids - No Xanthelasma] : the eyelids demonstrated no xanthelasmas [Normal Oral Mucosa] : normal oral mucosa [No Oral Pallor] : no oral pallor [No Oral Cyanosis] : no oral cyanosis [] : no respiratory distress [Auscultation Breath Sounds / Voice Sounds] : lungs were clear to auscultation bilaterally [Heart Rate And Rhythm] : heart rate and rhythm were normal [Heart Sounds] : normal S1 and S2 [Arterial Pulses Normal] : the arterial pulses were normal [Edema] : no peripheral edema present [Systolic grade ___/6] : A grade [unfilled]/6 systolic murmur was heard. [Abdomen Tenderness] : non-tender [Abdomen Mass (___ Cm)] : no abdominal mass palpated [Cyanosis, Localized] : no localized cyanosis [Nail Clubbing] : no clubbing of the fingernails [Oriented To Time, Place, And Person] : oriented to person, place, and time [FreeTextEntry1] : No JVD

## 2021-10-27 ENCOUNTER — APPOINTMENT (OUTPATIENT)
Dept: PLASTIC SURGERY | Facility: CLINIC | Age: 76
End: 2021-10-27
Payer: MEDICARE

## 2021-10-27 PROCEDURE — 99024 POSTOP FOLLOW-UP VISIT: CPT

## 2021-10-27 NOTE — PHYSICAL EXAM
[de-identified] : well-appearing, NAD [de-identified] : left inferior orbital swelling and bruising resolving  [de-identified] : left nasal flap healing well with small area of delayed healing at alar rim, good overall contour, suture line c/d/i, minimal swelling and bruising as expected

## 2021-10-27 NOTE — HISTORY OF PRESENT ILLNESS
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 Pt states lesion wasp resent approximately 2 months prior to biopsy 4/28/21. Due to see Dr. Norton for Mohs procedure and is here to discuss reconstruction.\par \par Nonsmoker, nondiabetic\par \par Interval hx (8/18/21). Patient presents today POD#7 s/p reconstruction of left nasal Moh's defect with LTR. Saw her ophthalmologist due to left eye pain post-op, diagnosed with corneal abrasion, used antibiotic eye drops with improvement of symptoms. Doing well otherwise. Denies any significant pain, f/c or bleeding. \par \par Interval hx (8/26/21). Patient presents today POD#15 s/p reconstruction of left nasal Moh's defect with LTR. Doing well with no significant pain, f/c or bleeding. Left eye pain and abrasion resolved. \par \par Interval hx (10/27/21). Patient presents today 2.5 months s/p reconstruction of left nasal Moh's defect with LTR. Doing well with no significant pain, f/c or bleeding.

## 2021-10-27 NOTE — ASSESSMENT
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 Doing well. \par \par - remaining sutures removed\par - daily Aquaphor\par - start Scarguard in 1-2 weeks\par - post-op instructions discussed\par - dermatology surveillance\par - f/u 2 months \par Pt was seen with Dr. Styles. \par \par Due to COVID 19, pre-visit patient instructions were explained to the patient and their symptoms were checked upon arrival.  \par Masks were used by the health care providers and staff and the examination room was cleaned after the patient visit was completed.\par \par \par

## 2021-10-27 NOTE — HISTORY OF PRESENT ILLNESS
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 Pt states lesion wasp resent approximately 2 months prior to biopsy 4/28/21. Due to see Dr. Norton for Mohs procedure and is here to discuss reconstruction.\par \par Nonsmoker, nondiabetic\par \par Interval hx (8/18/21). Patient presents today POD#7 s/p reconstruction of left nasal Moh's defect with LTR. Saw her ophthalmologist due to left eye pain post-op, diagnosed with corneal abrasion, used antibiotic eye drops with improvement of symptoms. Doing well otherwise. Denies any significant pain, f/c or bleeding. \par \par Interval hx (8/26/21). Patient presents today POD#15 s/p reconstruction of left nasal Moh's defect with LTR. Doing well with no significant pain, f/c or bleeding. Left eye pain and abrasion resolved. \par \par Interval hx (10/27/21). Patient presents today 2.5 months s/p reconstruction of left nasal Moh's defect with LTR. Doing well with no significant pain, f/c or bleeding.

## 2021-10-27 NOTE — PHYSICAL EXAM
[de-identified] : well-appearing, NAD [de-identified] : left inferior orbital swelling and bruising resolving  [de-identified] : left nasal flap healing well with small area of delayed healing at alar rim, good overall contour, suture line c/d/i, minimal swelling and bruising as expected

## 2021-11-09 ENCOUNTER — INPATIENT (INPATIENT)
Facility: HOSPITAL | Age: 76
LOS: 2 days | Discharge: HOME | End: 2021-11-12
Attending: SURGERY | Admitting: SURGERY
Payer: MEDICARE

## 2021-11-09 ENCOUNTER — APPOINTMENT (OUTPATIENT)
Dept: OBGYN | Facility: CLINIC | Age: 76
End: 2021-11-09
Payer: MEDICARE

## 2021-11-09 ENCOUNTER — NON-APPOINTMENT (OUTPATIENT)
Age: 76
End: 2021-11-09

## 2021-11-09 VITALS — WEIGHT: 120 LBS | BODY MASS INDEX: 23.56 KG/M2 | HEIGHT: 60 IN | TEMPERATURE: 98 F

## 2021-11-09 VITALS
HEIGHT: 60 IN | OXYGEN SATURATION: 100 % | DIASTOLIC BLOOD PRESSURE: 88 MMHG | HEART RATE: 60 BPM | WEIGHT: 119.93 LBS | TEMPERATURE: 98 F | SYSTOLIC BLOOD PRESSURE: 143 MMHG | RESPIRATION RATE: 16 BRPM

## 2021-11-09 VITALS — DIASTOLIC BLOOD PRESSURE: 78 MMHG | SYSTOLIC BLOOD PRESSURE: 120 MMHG

## 2021-11-09 DIAGNOSIS — Z98.891 HISTORY OF UTERINE SCAR FROM PREVIOUS SURGERY: Chronic | ICD-10-CM

## 2021-11-09 DIAGNOSIS — Z96.1 PRESENCE OF INTRAOCULAR LENS: Chronic | ICD-10-CM

## 2021-11-09 DIAGNOSIS — Z90.49 ACQUIRED ABSENCE OF OTHER SPECIFIED PARTS OF DIGESTIVE TRACT: Chronic | ICD-10-CM

## 2021-11-09 PROCEDURE — 99213 OFFICE O/P EST LOW 20 MIN: CPT

## 2021-11-09 PROCEDURE — 99285 EMERGENCY DEPT VISIT HI MDM: CPT

## 2021-11-10 LAB
ALBUMIN SERPL ELPH-MCNC: 4.4 G/DL — SIGNIFICANT CHANGE UP (ref 3.5–5.2)
ALBUMIN SERPL ELPH-MCNC: 4.9 G/DL — SIGNIFICANT CHANGE UP (ref 3.5–5.2)
ALP SERPL-CCNC: 65 U/L — SIGNIFICANT CHANGE UP (ref 30–115)
ALP SERPL-CCNC: 68 U/L — SIGNIFICANT CHANGE UP (ref 30–115)
ALT FLD-CCNC: 16 U/L — SIGNIFICANT CHANGE UP (ref 0–41)
ALT FLD-CCNC: 17 U/L — SIGNIFICANT CHANGE UP (ref 0–41)
ANION GAP SERPL CALC-SCNC: 15 MMOL/L — HIGH (ref 7–14)
ANION GAP SERPL CALC-SCNC: 17 MMOL/L — HIGH (ref 7–14)
APTT BLD: 27.5 SEC — SIGNIFICANT CHANGE UP (ref 27–39.2)
AST SERPL-CCNC: 21 U/L — SIGNIFICANT CHANGE UP (ref 0–41)
AST SERPL-CCNC: 26 U/L — SIGNIFICANT CHANGE UP (ref 0–41)
BASOPHILS # BLD AUTO: 0.02 K/UL — SIGNIFICANT CHANGE UP (ref 0–0.2)
BASOPHILS # BLD AUTO: 0.02 K/UL — SIGNIFICANT CHANGE UP (ref 0–0.2)
BASOPHILS NFR BLD AUTO: 0.2 % — SIGNIFICANT CHANGE UP (ref 0–1)
BASOPHILS NFR BLD AUTO: 0.2 % — SIGNIFICANT CHANGE UP (ref 0–1)
BILIRUB DIRECT SERPL-MCNC: 0.2 MG/DL — SIGNIFICANT CHANGE UP (ref 0–0.2)
BILIRUB INDIRECT FLD-MCNC: 0.6 MG/DL — SIGNIFICANT CHANGE UP (ref 0.2–1.2)
BILIRUB SERPL-MCNC: 0.8 MG/DL — SIGNIFICANT CHANGE UP (ref 0.2–1.2)
BILIRUB SERPL-MCNC: 0.9 MG/DL — SIGNIFICANT CHANGE UP (ref 0.2–1.2)
BLD GP AB SCN SERPL QL: SIGNIFICANT CHANGE UP
BUN SERPL-MCNC: 15 MG/DL — SIGNIFICANT CHANGE UP (ref 10–20)
BUN SERPL-MCNC: 21 MG/DL — HIGH (ref 10–20)
CALCIUM SERPL-MCNC: 9 MG/DL — SIGNIFICANT CHANGE UP (ref 8.5–10.1)
CALCIUM SERPL-MCNC: 9.5 MG/DL — SIGNIFICANT CHANGE UP (ref 8.5–10.1)
CHLORIDE SERPL-SCNC: 100 MMOL/L — SIGNIFICANT CHANGE UP (ref 98–110)
CHLORIDE SERPL-SCNC: 103 MMOL/L — SIGNIFICANT CHANGE UP (ref 98–110)
CO2 SERPL-SCNC: 21 MMOL/L — SIGNIFICANT CHANGE UP (ref 17–32)
CO2 SERPL-SCNC: 22 MMOL/L — SIGNIFICANT CHANGE UP (ref 17–32)
CREAT SERPL-MCNC: 0.6 MG/DL — LOW (ref 0.7–1.5)
CREAT SERPL-MCNC: 0.7 MG/DL — SIGNIFICANT CHANGE UP (ref 0.7–1.5)
EOSINOPHIL # BLD AUTO: 0 K/UL — SIGNIFICANT CHANGE UP (ref 0–0.7)
EOSINOPHIL # BLD AUTO: 0 K/UL — SIGNIFICANT CHANGE UP (ref 0–0.7)
EOSINOPHIL NFR BLD AUTO: 0 % — SIGNIFICANT CHANGE UP (ref 0–8)
EOSINOPHIL NFR BLD AUTO: 0 % — SIGNIFICANT CHANGE UP (ref 0–8)
GLUCOSE SERPL-MCNC: 107 MG/DL — HIGH (ref 70–99)
GLUCOSE SERPL-MCNC: 140 MG/DL — HIGH (ref 70–99)
HCT VFR BLD CALC: 39.3 % — SIGNIFICANT CHANGE UP (ref 37–47)
HCT VFR BLD CALC: 39.6 % — SIGNIFICANT CHANGE UP (ref 37–47)
HGB BLD-MCNC: 12.7 G/DL — SIGNIFICANT CHANGE UP (ref 12–16)
HGB BLD-MCNC: 12.8 G/DL — SIGNIFICANT CHANGE UP (ref 12–16)
IMM GRANULOCYTES NFR BLD AUTO: 0.1 % — SIGNIFICANT CHANGE UP (ref 0.1–0.3)
IMM GRANULOCYTES NFR BLD AUTO: 0.3 % — SIGNIFICANT CHANGE UP (ref 0.1–0.3)
INR BLD: 1.04 RATIO — SIGNIFICANT CHANGE UP (ref 0.65–1.3)
LACTATE SERPL-SCNC: 0.8 MMOL/L — SIGNIFICANT CHANGE UP (ref 0.7–2)
LACTATE SERPL-SCNC: 1 MMOL/L — SIGNIFICANT CHANGE UP (ref 0.7–2)
LIDOCAIN IGE QN: 25 U/L — SIGNIFICANT CHANGE UP (ref 7–60)
LYMPHOCYTES # BLD AUTO: 0.68 K/UL — LOW (ref 1.2–3.4)
LYMPHOCYTES # BLD AUTO: 0.83 K/UL — LOW (ref 1.2–3.4)
LYMPHOCYTES # BLD AUTO: 6.8 % — LOW (ref 20.5–51.1)
LYMPHOCYTES # BLD AUTO: 9.4 % — LOW (ref 20.5–51.1)
MCHC RBC-ENTMCNC: 28 PG — SIGNIFICANT CHANGE UP (ref 27–31)
MCHC RBC-ENTMCNC: 28.1 PG — SIGNIFICANT CHANGE UP (ref 27–31)
MCHC RBC-ENTMCNC: 32.3 G/DL — SIGNIFICANT CHANGE UP (ref 32–37)
MCHC RBC-ENTMCNC: 32.3 G/DL — SIGNIFICANT CHANGE UP (ref 32–37)
MCV RBC AUTO: 86.7 FL — SIGNIFICANT CHANGE UP (ref 81–99)
MCV RBC AUTO: 86.9 FL — SIGNIFICANT CHANGE UP (ref 81–99)
MONOCYTES # BLD AUTO: 0.16 K/UL — SIGNIFICANT CHANGE UP (ref 0.1–0.6)
MONOCYTES # BLD AUTO: 0.37 K/UL — SIGNIFICANT CHANGE UP (ref 0.1–0.6)
MONOCYTES NFR BLD AUTO: 1.6 % — LOW (ref 1.7–9.3)
MONOCYTES NFR BLD AUTO: 4.2 % — SIGNIFICANT CHANGE UP (ref 1.7–9.3)
NEUTROPHILS # BLD AUTO: 7.59 K/UL — HIGH (ref 1.4–6.5)
NEUTROPHILS # BLD AUTO: 9.17 K/UL — HIGH (ref 1.4–6.5)
NEUTROPHILS NFR BLD AUTO: 86.1 % — HIGH (ref 42.2–75.2)
NEUTROPHILS NFR BLD AUTO: 91.1 % — HIGH (ref 42.2–75.2)
NRBC # BLD: 0 /100 WBCS — SIGNIFICANT CHANGE UP (ref 0–0)
NRBC # BLD: 0 /100 WBCS — SIGNIFICANT CHANGE UP (ref 0–0)
PLATELET # BLD AUTO: 230 K/UL — SIGNIFICANT CHANGE UP (ref 130–400)
PLATELET # BLD AUTO: 240 K/UL — SIGNIFICANT CHANGE UP (ref 130–400)
POTASSIUM SERPL-MCNC: 4.3 MMOL/L — SIGNIFICANT CHANGE UP (ref 3.5–5)
POTASSIUM SERPL-MCNC: 4.3 MMOL/L — SIGNIFICANT CHANGE UP (ref 3.5–5)
POTASSIUM SERPL-SCNC: 4.3 MMOL/L — SIGNIFICANT CHANGE UP (ref 3.5–5)
POTASSIUM SERPL-SCNC: 4.3 MMOL/L — SIGNIFICANT CHANGE UP (ref 3.5–5)
PROT SERPL-MCNC: 6.6 G/DL — SIGNIFICANT CHANGE UP (ref 6–8)
PROT SERPL-MCNC: 7.1 G/DL — SIGNIFICANT CHANGE UP (ref 6–8)
PROTHROM AB SERPL-ACNC: 12 SEC — SIGNIFICANT CHANGE UP (ref 9.95–12.87)
RBC # BLD: 4.52 M/UL — SIGNIFICANT CHANGE UP (ref 4.2–5.4)
RBC # BLD: 4.57 M/UL — SIGNIFICANT CHANGE UP (ref 4.2–5.4)
RBC # FLD: 13.1 % — SIGNIFICANT CHANGE UP (ref 11.5–14.5)
RBC # FLD: 13.2 % — SIGNIFICANT CHANGE UP (ref 11.5–14.5)
SARS-COV-2 RNA SPEC QL NAA+PROBE: SIGNIFICANT CHANGE UP
SODIUM SERPL-SCNC: 136 MMOL/L — SIGNIFICANT CHANGE UP (ref 135–146)
SODIUM SERPL-SCNC: 142 MMOL/L — SIGNIFICANT CHANGE UP (ref 135–146)
WBC # BLD: 10.06 K/UL — SIGNIFICANT CHANGE UP (ref 4.8–10.8)
WBC # BLD: 8.82 K/UL — SIGNIFICANT CHANGE UP (ref 4.8–10.8)
WBC # FLD AUTO: 10.06 K/UL — SIGNIFICANT CHANGE UP (ref 4.8–10.8)
WBC # FLD AUTO: 8.82 K/UL — SIGNIFICANT CHANGE UP (ref 4.8–10.8)

## 2021-11-10 PROCEDURE — 71045 X-RAY EXAM CHEST 1 VIEW: CPT | Mod: 26

## 2021-11-10 PROCEDURE — 93010 ELECTROCARDIOGRAM REPORT: CPT

## 2021-11-10 PROCEDURE — 71045 X-RAY EXAM CHEST 1 VIEW: CPT | Mod: 26,77

## 2021-11-10 PROCEDURE — 74177 CT ABD & PELVIS W/CONTRAST: CPT | Mod: 26,MA

## 2021-11-10 PROCEDURE — 74177 CT ABD & PELVIS W/CONTRAST: CPT | Mod: 26

## 2021-11-10 PROCEDURE — 99223 1ST HOSP IP/OBS HIGH 75: CPT | Mod: 57

## 2021-11-10 RX ORDER — ENOXAPARIN SODIUM 100 MG/ML
40 INJECTION SUBCUTANEOUS DAILY
Refills: 0 | Status: DISCONTINUED | OUTPATIENT
Start: 2021-11-10 | End: 2021-11-11

## 2021-11-10 RX ORDER — MORPHINE SULFATE 50 MG/1
4 CAPSULE, EXTENDED RELEASE ORAL ONCE
Refills: 0 | Status: DISCONTINUED | OUTPATIENT
Start: 2021-11-10 | End: 2021-11-10

## 2021-11-10 RX ORDER — IOHEXOL 300 MG/ML
30 INJECTION, SOLUTION INTRAVENOUS ONCE
Refills: 0 | Status: COMPLETED | OUTPATIENT
Start: 2021-11-10 | End: 2021-11-10

## 2021-11-10 RX ORDER — SODIUM CHLORIDE 9 MG/ML
1000 INJECTION, SOLUTION INTRAVENOUS ONCE
Refills: 0 | Status: COMPLETED | OUTPATIENT
Start: 2021-11-10 | End: 2021-11-10

## 2021-11-10 RX ORDER — SODIUM CHLORIDE 9 MG/ML
1000 INJECTION, SOLUTION INTRAVENOUS
Refills: 0 | Status: DISCONTINUED | OUTPATIENT
Start: 2021-11-10 | End: 2021-11-11

## 2021-11-10 RX ORDER — ONDANSETRON 8 MG/1
4 TABLET, FILM COATED ORAL ONCE
Refills: 0 | Status: COMPLETED | OUTPATIENT
Start: 2021-11-10 | End: 2021-11-10

## 2021-11-10 RX ORDER — PANTOPRAZOLE SODIUM 20 MG/1
40 TABLET, DELAYED RELEASE ORAL DAILY
Refills: 0 | Status: DISCONTINUED | OUTPATIENT
Start: 2021-11-10 | End: 2021-11-11

## 2021-11-10 RX ORDER — HYDROMORPHONE HYDROCHLORIDE 2 MG/ML
0.5 INJECTION INTRAMUSCULAR; INTRAVENOUS; SUBCUTANEOUS EVERY 6 HOURS
Refills: 0 | Status: DISCONTINUED | OUTPATIENT
Start: 2021-11-10 | End: 2021-11-10

## 2021-11-10 RX ORDER — KETOROLAC TROMETHAMINE 30 MG/ML
15 SYRINGE (ML) INJECTION EVERY 6 HOURS
Refills: 0 | Status: DISCONTINUED | OUTPATIENT
Start: 2021-11-10 | End: 2021-11-11

## 2021-11-10 RX ORDER — VERAPAMIL HCL 240 MG
120 CAPSULE, EXTENDED RELEASE PELLETS 24 HR ORAL DAILY
Refills: 0 | Status: DISCONTINUED | OUTPATIENT
Start: 2021-11-10 | End: 2021-11-11

## 2021-11-10 RX ADMIN — SODIUM CHLORIDE 1000 MILLILITER(S): 9 INJECTION, SOLUTION INTRAVENOUS at 01:22

## 2021-11-10 RX ADMIN — Medication 15 MILLIGRAM(S): at 15:35

## 2021-11-10 RX ADMIN — MORPHINE SULFATE 4 MILLIGRAM(S): 50 CAPSULE, EXTENDED RELEASE ORAL at 01:22

## 2021-11-10 RX ADMIN — PANTOPRAZOLE SODIUM 40 MILLIGRAM(S): 20 TABLET, DELAYED RELEASE ORAL at 14:02

## 2021-11-10 RX ADMIN — MORPHINE SULFATE 4 MILLIGRAM(S): 50 CAPSULE, EXTENDED RELEASE ORAL at 03:39

## 2021-11-10 RX ADMIN — ENOXAPARIN SODIUM 40 MILLIGRAM(S): 100 INJECTION SUBCUTANEOUS at 14:02

## 2021-11-10 RX ADMIN — Medication 10 MILLIGRAM(S): at 03:38

## 2021-11-10 RX ADMIN — Medication 30 MILLILITER(S): at 01:22

## 2021-11-10 RX ADMIN — IOHEXOL 30 MILLILITER(S): 300 INJECTION, SOLUTION INTRAVENOUS at 12:53

## 2021-11-10 RX ADMIN — ONDANSETRON 4 MILLIGRAM(S): 8 TABLET, FILM COATED ORAL at 01:22

## 2021-11-10 RX ADMIN — SODIUM CHLORIDE 125 MILLILITER(S): 9 INJECTION, SOLUTION INTRAVENOUS at 12:53

## 2021-11-10 RX ADMIN — MORPHINE SULFATE 4 MILLIGRAM(S): 50 CAPSULE, EXTENDED RELEASE ORAL at 08:13

## 2021-11-10 NOTE — H&P ADULT - NSHPPHYSICALEXAM_GEN_ALL_CORE
PHYSICAL EXAM:  General: NAD, AAOx3,   HEENT: NCAT, Trachea ML, Neck supple  Cardiac: RRR S1, S2,   Respiratory: CTAB, normal respiratory effort,   Abdomen: Soft, mild distended, mild tender, no rebound, no guarding.   Vascular: Pulses 2+ throughout, extremities well perfused  Skin: Warm/dry, normal color, no jaundice PHYSICAL EXAM:  General: NAD, AAOx3,   HEENT: NCAT, Trachea ML, Neck supple  Cardiac: RRR S1, S2,   Respiratory: CTAB, normal respiratory effort,   Abdomen: Soft, mild distended, mild tender in RLQ, no rebound, no guarding. Well healed  and laparoscopic scars  Vascular: Pulses 2+ throughout, extremities well perfused  Skin: Warm/dry, normal color, no jaundice

## 2021-11-10 NOTE — ED ADULT NURSE NOTE - OBJECTIVE STATEMENT
76 y/o female presents to ED with c/o abdominal cramping and vomiting since 5pm today. pt denies any nausea, but reports feeling of need to vomit. pt states she has vomiting multiple times since.

## 2021-11-10 NOTE — ED PROVIDER NOTE - ATTENDING CONTRIBUTION TO CARE
76 yo F pmh of SVT and colitis presents with abdoimnal pain. Pain started a few hours ago, cramping diffuse, 8/10, intermittent. + N/V, no diarrhea but increased bowel movement. no fevers. Did eat out yesterday but no other people were sick, no sick contacts, no recent traveling. no fevers. no chest pain, no shortness of breath, no palpitations.     CONSTITUTIONAL: Well-developed; well-nourished; in no acute distress.   SKIN: warm, dry  HEAD: Normocephalic; atraumatic.  EYES: PERRL, EOMI, no conjunctival erythema  ENT: No nasal discharge; airway clear.  NECK: Supple; non tender.  CARD: S1, S2 normal;  Regular rate and rhythm.   RESP: No wheezes, rales or rhonchi.  ABD: soft + diffuse mild tenderness, non distended, no rebound or guarding  EXT: Normal ROM.  5/5 strength in all 4 extremities   LYMPH: No acute cervical adenopathy.  NEURO: Alert, oriented, grossly unremarkable. neurovascularly intact  PSYCH: Cooperative, appropriate.

## 2021-11-10 NOTE — H&P ADULT - ATTENDING COMMENTS
Patient seen and examined with surgery team in ED awaiting hospital bed and discussed management plans. NG tube in place now. Prior CT scan reviewed some fecalization of small bowel. + diverticulosis no masses. Mild small bowel dilation with some interloop fluid in pelvic area. Will repeat CT scan prior to exploration Discussed all options with patient and informed consent signed

## 2021-11-10 NOTE — H&P ADULT - HISTORY OF PRESENT ILLNESS
75 year old female with PMHX of SVT on verapamil, recurrent episodes of colitis f/u by GI, PSHx of , and laparoscopic cholecystectomy. Presents to ED with abdominal pain for 1 day. Patient states diffuse, continuous, moderate to severe abdominal pain,  associated with multiple episodes of bilious emesis. Pt denies CP, SOB, diarrhea, fever, chills, urinary symptoms. had 3 BM yesterday. In ED, CT scan showed     75 year old female with PMHX of SVT on verapamil, recurrent episodes of colitis f/u by GI, PSHx of , and laparoscopic cholecystectomy. Presents to ED with abdominal pain for 1 day. Patient states diffuse, continuous, moderate to severe abdominal pain,  associated with multiple episodes of bilious emesis. Pt denies CP, SOB, diarrhea, fever, chills, urinary symptoms. had 3 BM yesterday. In ED, CT scan showed a fluid-filled distended small bowel loop in the right lower quadrant, likely a distal ileal loop with distal transition to collapsed terminal ileum and concurrent proximal transition point in the same location consistent with a closed loop obstruction. There is edema and fluid of the associated mesentery

## 2021-11-10 NOTE — ED PROVIDER NOTE - OBJECTIVE STATEMENT
75 year old female, past medical history SVT on verapamil, who presents with abd pain, vomiting. patient reports diffuse abd pain described as cramping that began at 5pm last night w/ associated nausea, nb/nb emesis prompting presentation to ed. patient reports associated non-bloody diarrhea. denies fever, chills, chest pain, SOB, back pain, urinary symptoms. no hx abd surgeries.

## 2021-11-10 NOTE — H&P ADULT - ASSESSMENT
75 year old female with PMHX of SVT on verapamil, recurrent episodes of colitis f/u by GI, PSHx of , and laparoscopic cholecystectomy. Presents to ED with abdominal pain for 1 day. Patient states diffuse, continuous, moderate to severe abdominal pain,  associated with multiple episodes of bilious emesis. Pt denies CP, SOB, diarrhea, fever, chills, urinary symptoms. had 3 BM yesterday 75 year old female with PMHX of SVT on verapamil, recurrent episodes of colitis f/u by GI, PSHx of , and laparoscopic cholecystectomy. Presents to ED with abdominal pain for 1 day. Patient states diffuse, continuous, moderate to severe abdominal pain,  associated with multiple episodes of bilious emesis. Pt denies CP, SOB, diarrhea, fever, chills, urinary symptoms. had 3 BM yesterday. In ED, CT scan showed a fluid-filled distended small bowel loop in the right lower quadrant, likely a distal ileal loop with distal transition to collapsed terminal ileum and concurrent proximal transition point in the same location consistent with a closed loop obstruction. There is edema and fluid of the associated mesentery    Plan  - Admit to Surgery. Dr. Segura  - Monitor vital signs  - Continue NPO, IVF  - Pain control as needed  - Repeat lab work and replete as needed  - Case discussed with Dr. Segura, patient and family member  75 year old female with PMHX of SVT on verapamil, recurrent episodes of colitis f/u by GI, PSHx of , and laparoscopic cholecystectomy. Presents to ED with abdominal pain for 1 day. Patient states diffuse, continuous, moderate to severe abdominal pain,  associated with multiple episodes of bilious emesis. Pt denies CP, SOB, diarrhea, fever, chills, urinary symptoms. had 3 BM yesterday. In ED, CT scan showed a fluid-filled distended small bowel loop in the right lower quadrant, likely a distal ileal loop with distal transition to collapsed terminal ileum and concurrent proximal transition point in the same location consistent with a closed loop obstruction. There is edema and fluid of the associated mesentery    Plan  - Admit to Surgery. Dr. Segura  - Monitor vital signs  - Continue NPO, IVF  - Pain control as needed  - Repeat lab work and replete as needed  - NGT placement  - CT scan abd/pelv with PO contrast  - Add on to OR for Possible laparoscopy diagnostic  - Case discussed with Dr. Segura, patient and family member  75 year old female with PMHX of SVT on verapamil, recurrent episodes of colitis f/u by GI, PSHx of , and laparoscopic cholecystectomy. Presents to ED with abdominal pain for 1 day. Patient states diffuse, continuous, moderate to severe abdominal pain,  associated with multiple episodes of non bilious emesis. Pt denies CP, SOB, diarrhea, fever, chills, urinary symptoms. had 3 BM yesterday. In ED, CT scan showed a fluid-filled distended small bowel loop in the right lower quadrant, likely a distal ileal loop with distal transition to collapsed terminal ileum and concurrent proximal transition point in the same location consistent with a closed loop obstruction. There is edema and fluid of the associated mesentery    Plan  - Admit to Surgery. Dr. Segura  - Monitor vital signs  - Continue NPO, IVF  - Pain control as needed  - Repeat lab work and replete as needed  - NGT placement  - CT scan abd/pelv with PO contrast  - Add on to OR for Possible laparoscopy diagnostic  - Case discussed with Dr. Segura, patient and family member

## 2021-11-10 NOTE — ED PROVIDER NOTE - PHYSICAL EXAMINATION
CONSTITUTIONAL: Well-developed; well-nourished; in no acute distress, nontoxic appearing  SKIN: skin exam is warm and dry  HEAD: Normocephalic; atraumatic  ENT: MMM  CARD: S1, S2 normal, no murmur  RESP: No wheezes, rales or rhonchi. Good air movement bilaterally  ABD: soft; +epigastric ttp, no rebound/guarding. no cvat   EXT: Normal ROM.   NEURO: awake, alert, following commands, oriented, grossly unremarkable. No Focal deficits. GCS 15.   PSYCH: Cooperative, appropriate.

## 2021-11-10 NOTE — ED PROVIDER NOTE - NS ED ROS FT
Review of Systems:  	•	CONSTITUTIONAL: no fever, no chills   	•	SKIN: no rash  	•	HEMATOLOGIC: no bleeding, no bruising  	•	EYES: no eye pain, no blurry vision  	•	ENT: no sore throat   	•	RESPIRATORY: no shortness of breath, no cough  	•	CARDIAC: no chest pain, no palpitations  	•	GI: +abd pain, +nausea, +vomiting, +diarrhea   	•	GENITO-URINARY: no discharge, no dysuria; no hematuria, no increased urinary frequency  	•	MUSCULOSKELETAL: no joint paint, no swelling, no redness  	•	NEUROLOGIC: no weakness, no headache  	•	PSYCH: no anxiety, non suicidal, non homicidal, no hallucination, no depression

## 2021-11-10 NOTE — H&P ADULT - NSHPLABSRESULTS_GEN_ALL_CORE
Labs:  CAPILLARY BLOOD GLUCOSE                              12.7   10.06 )-----------( 240      ( 10 Nov 2021 00:51 )             39.3       Auto Neutrophil %: 91.1 % (11-10-21 @ 00:51)  Auto Immature Granulocyte %: 0.3 % (11-10-21 @ 00:51)    11-10    142  |  103  |  21<H>  ----------------------------<  140<H>  4.3   |  22  |  0.7      Calcium, Total Serum: 9.5 mg/dL (11-10-21 @ 00:51)      LFTs:             7.1  | 0.8  | 21       ------------------[68      ( 10 Nov 2021 00:51 )  4.9  | 0.2  | 16          Lipase:25     Amylase:x         Lactate, Blood: 1.0 mmol/L (11-10-21 @ 00:51)      Coags:    < from: CT Abdomen and Pelvis w/ IV Cont (11.10.21 @ 05:38) >      PERITONEUM/MESENTERY/BOWEL: There is a fluid-filleddistended small bowel loop in the right lower quadrant, likely a distal ileal loop with distal transition to collapsed terminal ileum and concurrent proximal transition point in the same location (coronal image 15-17) consistent with a closed loop obstruction. There is edema and fluid of the associated mesentery. There is small bowel dilatation with formed stool upstream to the proximal transition point. Small-volume ascites. No pneumatosis or pneumoperitoneum at this time. Unremarkable appendix. Colonic diverticulosis.    BONES/SOFT TISSUES: Degenerative changes of the spine.    OTHER: Atherosclerotic vascular calcifications      IMPRESSION:    Findings compatible with right lower quadrant closed loop small bowel obstruction presumably of the distal ileum.    < end of copied text > Labs:  CAPILLARY BLOOD GLUCOSE                              12.7   10.06 )-----------( 240      ( 10 Nov 2021 00:51 )             39.3       Auto Neutrophil %: 91.1 % (11-10-21 @ 00:51)  Auto Immature Granulocyte %: 0.3 % (11-10-21 @ 00:51)    11-10    142  |  103  |  21<H>  ----------------------------<  140<H>  4.3   |  22  |  0.7      Calcium, Total Serum: 9.5 mg/dL (11-10-21 @ 00:51)      LFTs:             7.1  | 0.8  | 21       ------------------[68      ( 10 Nov 2021 00:51 )  4.9  | 0.2  | 16          Lipase:25     Amylase:x         Lactate, Blood: 1.0 mmol/L (11-10-21 @ 00:51)      Coags:    < from: CT Abdomen and Pelvis w/ IV Cont (11.10.21 @ 05:38) >      PERITONEUM/MESENTERY/BOWEL: There is a fluid-filled distended small bowel loop in the right lower quadrant, likely a distal ileal loop with distal transition to collapsed terminal ileum and concurrent proximal transition point in the same location (coronal image 15-17) consistent with a closed loop obstruction. There is edema and fluid of the associated mesentery. There is small bowel dilatation with formed stool upstream to the proximal transition point. Small-volume ascites. No pneumatosis or pneumoperitoneum at this time. Unremarkable appendix. Colonic diverticulosis.    BONES/SOFT TISSUES: Degenerative changes of the spine.    OTHER: Atherosclerotic vascular calcifications      IMPRESSION:    Findings compatible with right lower quadrant closed loop small bowel obstruction presumably of the distal ileum.    < end of copied text > Right Hand/Right Foot

## 2021-11-10 NOTE — ED PROVIDER NOTE - CLINICAL SUMMARY MEDICAL DECISION MAKING FREE TEXT BOX
evaluated for abd pain, found to have bowel obstruction, surgery consulted. patient admitted to surgery for further management.

## 2021-11-11 LAB
ANION GAP SERPL CALC-SCNC: 15 MMOL/L — HIGH (ref 7–14)
ANION GAP SERPL CALC-SCNC: 18 MMOL/L — HIGH (ref 7–14)
ANION GAP SERPL CALC-SCNC: 19 MMOL/L — HIGH (ref 7–14)
BASOPHILS # BLD AUTO: 0.02 K/UL — SIGNIFICANT CHANGE UP (ref 0–0.2)
BASOPHILS NFR BLD AUTO: 0.1 % — SIGNIFICANT CHANGE UP (ref 0–1)
BASOPHILS NFR BLD AUTO: 0.2 % — SIGNIFICANT CHANGE UP (ref 0–1)
BASOPHILS NFR BLD AUTO: 0.2 % — SIGNIFICANT CHANGE UP (ref 0–1)
BUN SERPL-MCNC: 17 MG/DL — SIGNIFICANT CHANGE UP (ref 10–20)
BUN SERPL-MCNC: 19 MG/DL — SIGNIFICANT CHANGE UP (ref 10–20)
BUN SERPL-MCNC: 21 MG/DL — HIGH (ref 10–20)
CALCIUM SERPL-MCNC: 8.4 MG/DL — LOW (ref 8.5–10.1)
CALCIUM SERPL-MCNC: 8.9 MG/DL — SIGNIFICANT CHANGE UP (ref 8.5–10.1)
CALCIUM SERPL-MCNC: 9.1 MG/DL — SIGNIFICANT CHANGE UP (ref 8.5–10.1)
CHLORIDE SERPL-SCNC: 101 MMOL/L — SIGNIFICANT CHANGE UP (ref 98–110)
CHLORIDE SERPL-SCNC: 102 MMOL/L — SIGNIFICANT CHANGE UP (ref 98–110)
CHLORIDE SERPL-SCNC: 102 MMOL/L — SIGNIFICANT CHANGE UP (ref 98–110)
CO2 SERPL-SCNC: 20 MMOL/L — SIGNIFICANT CHANGE UP (ref 17–32)
CO2 SERPL-SCNC: 22 MMOL/L — SIGNIFICANT CHANGE UP (ref 17–32)
CO2 SERPL-SCNC: 23 MMOL/L — SIGNIFICANT CHANGE UP (ref 17–32)
COVID-19 SPIKE DOMAIN AB INTERP: POSITIVE
COVID-19 SPIKE DOMAIN ANTIBODY RESULT: >250 U/ML — HIGH
CREAT SERPL-MCNC: 0.6 MG/DL — LOW (ref 0.7–1.5)
CREAT SERPL-MCNC: 0.7 MG/DL — SIGNIFICANT CHANGE UP (ref 0.7–1.5)
CREAT SERPL-MCNC: 0.8 MG/DL — SIGNIFICANT CHANGE UP (ref 0.7–1.5)
EOSINOPHIL # BLD AUTO: 0.01 K/UL — SIGNIFICANT CHANGE UP (ref 0–0.7)
EOSINOPHIL # BLD AUTO: 0.01 K/UL — SIGNIFICANT CHANGE UP (ref 0–0.7)
EOSINOPHIL # BLD AUTO: 0.03 K/UL — SIGNIFICANT CHANGE UP (ref 0–0.7)
EOSINOPHIL NFR BLD AUTO: 0.1 % — SIGNIFICANT CHANGE UP (ref 0–8)
EOSINOPHIL NFR BLD AUTO: 0.1 % — SIGNIFICANT CHANGE UP (ref 0–8)
EOSINOPHIL NFR BLD AUTO: 0.3 % — SIGNIFICANT CHANGE UP (ref 0–8)
GLUCOSE BLDC GLUCOMTR-MCNC: 89 MG/DL — SIGNIFICANT CHANGE UP (ref 70–99)
GLUCOSE SERPL-MCNC: 81 MG/DL — SIGNIFICANT CHANGE UP (ref 70–99)
GLUCOSE SERPL-MCNC: 99 MG/DL — SIGNIFICANT CHANGE UP (ref 70–99)
GLUCOSE SERPL-MCNC: 99 MG/DL — SIGNIFICANT CHANGE UP (ref 70–99)
HCT VFR BLD CALC: 37.4 % — SIGNIFICANT CHANGE UP (ref 37–47)
HCT VFR BLD CALC: 40.9 % — SIGNIFICANT CHANGE UP (ref 37–47)
HCT VFR BLD CALC: 41.7 % — SIGNIFICANT CHANGE UP (ref 37–47)
HCV AB S/CO SERPL IA: 0.04 COI — SIGNIFICANT CHANGE UP
HCV AB SERPL-IMP: SIGNIFICANT CHANGE UP
HGB BLD-MCNC: 12.3 G/DL — SIGNIFICANT CHANGE UP (ref 12–16)
HGB BLD-MCNC: 13.3 G/DL — SIGNIFICANT CHANGE UP (ref 12–16)
HGB BLD-MCNC: 13.5 G/DL — SIGNIFICANT CHANGE UP (ref 12–16)
IMM GRANULOCYTES NFR BLD AUTO: 0.2 % — SIGNIFICANT CHANGE UP (ref 0.1–0.3)
IMM GRANULOCYTES NFR BLD AUTO: 0.3 % — SIGNIFICANT CHANGE UP (ref 0.1–0.3)
IMM GRANULOCYTES NFR BLD AUTO: 0.4 % — HIGH (ref 0.1–0.3)
LACTATE SERPL-SCNC: 1.2 MMOL/L — SIGNIFICANT CHANGE UP (ref 0.7–2)
LYMPHOCYTES # BLD AUTO: 0.75 K/UL — LOW (ref 1.2–3.4)
LYMPHOCYTES # BLD AUTO: 0.8 K/UL — LOW (ref 1.2–3.4)
LYMPHOCYTES # BLD AUTO: 1.51 K/UL — SIGNIFICANT CHANGE UP (ref 1.2–3.4)
LYMPHOCYTES # BLD AUTO: 17.2 % — LOW (ref 20.5–51.1)
LYMPHOCYTES # BLD AUTO: 5.1 % — LOW (ref 20.5–51.1)
LYMPHOCYTES # BLD AUTO: 7.1 % — LOW (ref 20.5–51.1)
MAGNESIUM SERPL-MCNC: 2.1 MG/DL — SIGNIFICANT CHANGE UP (ref 1.8–2.4)
MAGNESIUM SERPL-MCNC: 2.2 MG/DL — SIGNIFICANT CHANGE UP (ref 1.8–2.4)
MAGNESIUM SERPL-MCNC: 2.3 MG/DL — SIGNIFICANT CHANGE UP (ref 1.8–2.4)
MCHC RBC-ENTMCNC: 28.3 PG — SIGNIFICANT CHANGE UP (ref 27–31)
MCHC RBC-ENTMCNC: 28.7 PG — SIGNIFICANT CHANGE UP (ref 27–31)
MCHC RBC-ENTMCNC: 28.7 PG — SIGNIFICANT CHANGE UP (ref 27–31)
MCHC RBC-ENTMCNC: 32.4 G/DL — SIGNIFICANT CHANGE UP (ref 32–37)
MCHC RBC-ENTMCNC: 32.5 G/DL — SIGNIFICANT CHANGE UP (ref 32–37)
MCHC RBC-ENTMCNC: 32.9 G/DL — SIGNIFICANT CHANGE UP (ref 32–37)
MCV RBC AUTO: 87.4 FL — SIGNIFICANT CHANGE UP (ref 81–99)
MCV RBC AUTO: 87.4 FL — SIGNIFICANT CHANGE UP (ref 81–99)
MCV RBC AUTO: 88.1 FL — SIGNIFICANT CHANGE UP (ref 81–99)
MONOCYTES # BLD AUTO: 0.37 K/UL — SIGNIFICANT CHANGE UP (ref 0.1–0.6)
MONOCYTES # BLD AUTO: 0.51 K/UL — SIGNIFICANT CHANGE UP (ref 0.1–0.6)
MONOCYTES # BLD AUTO: 0.73 K/UL — HIGH (ref 0.1–0.6)
MONOCYTES NFR BLD AUTO: 2.5 % — SIGNIFICANT CHANGE UP (ref 1.7–9.3)
MONOCYTES NFR BLD AUTO: 5.8 % — SIGNIFICANT CHANGE UP (ref 1.7–9.3)
MONOCYTES NFR BLD AUTO: 6.5 % — SIGNIFICANT CHANGE UP (ref 1.7–9.3)
NEUTROPHILS # BLD AUTO: 13.49 K/UL — HIGH (ref 1.4–6.5)
NEUTROPHILS # BLD AUTO: 6.68 K/UL — HIGH (ref 1.4–6.5)
NEUTROPHILS # BLD AUTO: 9.65 K/UL — HIGH (ref 1.4–6.5)
NEUTROPHILS NFR BLD AUTO: 76.3 % — HIGH (ref 42.2–75.2)
NEUTROPHILS NFR BLD AUTO: 85.7 % — HIGH (ref 42.2–75.2)
NEUTROPHILS NFR BLD AUTO: 91.9 % — HIGH (ref 42.2–75.2)
NRBC # BLD: 0 /100 WBCS — SIGNIFICANT CHANGE UP (ref 0–0)
PHOSPHATE SERPL-MCNC: 3.7 MG/DL — SIGNIFICANT CHANGE UP (ref 2.1–4.9)
PHOSPHATE SERPL-MCNC: 3.9 MG/DL — SIGNIFICANT CHANGE UP (ref 2.1–4.9)
PHOSPHATE SERPL-MCNC: 3.9 MG/DL — SIGNIFICANT CHANGE UP (ref 2.1–4.9)
PLATELET # BLD AUTO: 212 K/UL — SIGNIFICANT CHANGE UP (ref 130–400)
PLATELET # BLD AUTO: 219 K/UL — SIGNIFICANT CHANGE UP (ref 130–400)
PLATELET # BLD AUTO: 230 K/UL — SIGNIFICANT CHANGE UP (ref 130–400)
POTASSIUM SERPL-MCNC: 4.1 MMOL/L — SIGNIFICANT CHANGE UP (ref 3.5–5)
POTASSIUM SERPL-MCNC: 4.2 MMOL/L — SIGNIFICANT CHANGE UP (ref 3.5–5)
POTASSIUM SERPL-MCNC: 4.9 MMOL/L — SIGNIFICANT CHANGE UP (ref 3.5–5)
POTASSIUM SERPL-SCNC: 4.1 MMOL/L — SIGNIFICANT CHANGE UP (ref 3.5–5)
POTASSIUM SERPL-SCNC: 4.2 MMOL/L — SIGNIFICANT CHANGE UP (ref 3.5–5)
POTASSIUM SERPL-SCNC: 4.9 MMOL/L — SIGNIFICANT CHANGE UP (ref 3.5–5)
RBC # BLD: 4.28 M/UL — SIGNIFICANT CHANGE UP (ref 4.2–5.4)
RBC # BLD: 4.64 M/UL — SIGNIFICANT CHANGE UP (ref 4.2–5.4)
RBC # BLD: 4.77 M/UL — SIGNIFICANT CHANGE UP (ref 4.2–5.4)
RBC # FLD: 13.2 % — SIGNIFICANT CHANGE UP (ref 11.5–14.5)
RBC # FLD: 13.3 % — SIGNIFICANT CHANGE UP (ref 11.5–14.5)
RBC # FLD: 13.3 % — SIGNIFICANT CHANGE UP (ref 11.5–14.5)
SARS-COV-2 IGG+IGM SERPL QL IA: >250 U/ML — HIGH
SARS-COV-2 IGG+IGM SERPL QL IA: POSITIVE
SODIUM SERPL-SCNC: 139 MMOL/L — SIGNIFICANT CHANGE UP (ref 135–146)
SODIUM SERPL-SCNC: 141 MMOL/L — SIGNIFICANT CHANGE UP (ref 135–146)
SODIUM SERPL-SCNC: 142 MMOL/L — SIGNIFICANT CHANGE UP (ref 135–146)
WBC # BLD: 11.25 K/UL — HIGH (ref 4.8–10.8)
WBC # BLD: 14.68 K/UL — HIGH (ref 4.8–10.8)
WBC # BLD: 8.77 K/UL — SIGNIFICANT CHANGE UP (ref 4.8–10.8)
WBC # FLD AUTO: 11.25 K/UL — HIGH (ref 4.8–10.8)
WBC # FLD AUTO: 14.68 K/UL — HIGH (ref 4.8–10.8)
WBC # FLD AUTO: 8.77 K/UL — SIGNIFICANT CHANGE UP (ref 4.8–10.8)

## 2021-11-11 PROCEDURE — 58660 LAPAROSCOPY LYSIS: CPT

## 2021-11-11 PROCEDURE — 93010 ELECTROCARDIOGRAM REPORT: CPT

## 2021-11-11 PROCEDURE — 71045 X-RAY EXAM CHEST 1 VIEW: CPT | Mod: 26

## 2021-11-11 RX ORDER — ACETAMINOPHEN 500 MG
1000 TABLET ORAL ONCE
Refills: 0 | Status: COMPLETED | OUTPATIENT
Start: 2021-11-11 | End: 2021-11-11

## 2021-11-11 RX ORDER — VERAPAMIL HCL 240 MG
60 CAPSULE, EXTENDED RELEASE PELLETS 24 HR ORAL
Refills: 0 | Status: DISCONTINUED | OUTPATIENT
Start: 2021-11-11 | End: 2021-11-12

## 2021-11-11 RX ORDER — PANTOPRAZOLE SODIUM 20 MG/1
40 TABLET, DELAYED RELEASE ORAL DAILY
Refills: 0 | Status: DISCONTINUED | OUTPATIENT
Start: 2021-11-11 | End: 2021-11-12

## 2021-11-11 RX ORDER — HYDROMORPHONE HYDROCHLORIDE 2 MG/ML
1 INJECTION INTRAMUSCULAR; INTRAVENOUS; SUBCUTANEOUS
Refills: 0 | Status: DISCONTINUED | OUTPATIENT
Start: 2021-11-11 | End: 2021-11-11

## 2021-11-11 RX ORDER — VERAPAMIL HCL 240 MG
60 CAPSULE, EXTENDED RELEASE PELLETS 24 HR ORAL
Refills: 0 | Status: DISCONTINUED | OUTPATIENT
Start: 2021-11-11 | End: 2021-11-11

## 2021-11-11 RX ORDER — TAMSULOSIN HYDROCHLORIDE 0.4 MG/1
0.4 CAPSULE ORAL AT BEDTIME
Refills: 0 | Status: DISCONTINUED | OUTPATIENT
Start: 2021-11-11 | End: 2021-11-11

## 2021-11-11 RX ORDER — ENOXAPARIN SODIUM 100 MG/ML
40 INJECTION SUBCUTANEOUS DAILY
Refills: 0 | Status: DISCONTINUED | OUTPATIENT
Start: 2021-11-11 | End: 2021-11-12

## 2021-11-11 RX ORDER — SODIUM CHLORIDE 9 MG/ML
1000 INJECTION, SOLUTION INTRAVENOUS
Refills: 0 | Status: DISCONTINUED | OUTPATIENT
Start: 2021-11-11 | End: 2021-11-11

## 2021-11-11 RX ORDER — ONDANSETRON 8 MG/1
4 TABLET, FILM COATED ORAL ONCE
Refills: 0 | Status: DISCONTINUED | OUTPATIENT
Start: 2021-11-11 | End: 2021-11-11

## 2021-11-11 RX ORDER — HYDROMORPHONE HYDROCHLORIDE 2 MG/ML
0.5 INJECTION INTRAMUSCULAR; INTRAVENOUS; SUBCUTANEOUS
Refills: 0 | Status: DISCONTINUED | OUTPATIENT
Start: 2021-11-11 | End: 2021-11-11

## 2021-11-11 RX ORDER — ASPIRIN/CALCIUM CARB/MAGNESIUM 324 MG
81 TABLET ORAL DAILY
Refills: 0 | Status: DISCONTINUED | OUTPATIENT
Start: 2021-11-11 | End: 2021-11-11

## 2021-11-11 RX ORDER — VERAPAMIL HCL 240 MG
120 CAPSULE, EXTENDED RELEASE PELLETS 24 HR ORAL DAILY
Refills: 0 | Status: DISCONTINUED | OUTPATIENT
Start: 2021-11-11 | End: 2021-11-11

## 2021-11-11 RX ORDER — SODIUM CHLORIDE 9 MG/ML
1000 INJECTION, SOLUTION INTRAVENOUS
Refills: 0 | Status: DISCONTINUED | OUTPATIENT
Start: 2021-11-11 | End: 2021-11-12

## 2021-11-11 RX ADMIN — SODIUM CHLORIDE 125 MILLILITER(S): 9 INJECTION, SOLUTION INTRAVENOUS at 04:59

## 2021-11-11 RX ADMIN — Medication 400 MILLIGRAM(S): at 13:19

## 2021-11-11 RX ADMIN — Medication 60 MILLIGRAM(S): at 10:15

## 2021-11-11 RX ADMIN — Medication 15 MILLIGRAM(S): at 04:46

## 2021-11-11 RX ADMIN — Medication 60 MILLIGRAM(S): at 18:22

## 2021-11-11 RX ADMIN — SODIUM CHLORIDE 75 MILLILITER(S): 9 INJECTION, SOLUTION INTRAVENOUS at 13:19

## 2021-11-11 NOTE — PROGRESS NOTE ADULT - ASSESSMENT
75 year old female with PMHX of SVT on verapamil, recurrent episodes of colitis f/u by GI, PSHx of , and laparoscopic cholecystectomy. Presents to ED with abdominal pain for 1 day. Patient states diffuse, continuous, moderate to severe abdominal pain,  associated with multiple episodes of non bilious emesis. PT evaluated at bedside AAOX3, NAD, on physical exam moderate tenderness to palpation on RLQ and LLQ, no rebound, no guarding, non distended. NGT in place with minimal output, gastric content. Pt reports chills, no fever recorded, no BM, no gas, +voiding. Physical exam mildly improved, pt still with pain, CT impression of high grade mechanical obstruction on distal ileum, compatible with a closed loop obstruction; because of clinical and imaging impression pt will be taken to the OR today for dx laparoscopy.     Plan  - NGT to suction  -pain control  -IVFs  -IV ABX  -GI/DVT ppx  -Pre-op labs ordered  -Consent in chart  -Added on for OR today for dx laparoscopy, possible small bowel resection  -Monitor INS and OUTs    spectra [phone]

## 2021-11-11 NOTE — PROVIDER CONTACT NOTE (OTHER) - SITUATION
Pariwnt BP at this time is 193/77 hr 70, afebrile w/ spo2 = 96% on RA, Patient c.o abdominal discomfort, and chills followed by hot flashes, pt states "I just don't feel right."

## 2021-11-11 NOTE — PROVIDER CONTACT NOTE (OTHER) - BACKGROUND
Verapamil ER held overnight per handoff report pending morning team MD evaluation as patient is NPO w/ NGT to LCS.

## 2021-11-11 NOTE — PROCEDURE NOTE - NSPROCDETAILS_GEN_ALL_CORE
nasogastric/placement confirmed by auscultation
location identified, draped/prepped, sterile technique used/sterile dressing applied/sterile technique, catheter placed/ultrasound guidance

## 2021-11-11 NOTE — CHART NOTE - NSCHARTNOTEFT_GEN_A_CORE
Post Operative Note  Patient: VIRGIL ZACARIAS 75y (1945) Female   MRN: [id number]  Location: [address]  Visit: 11-10-21 Inpatient  Date: 11-11-21 @ 20:11    POD 0 s/p Small bowel obstruction due to adhesions, S/P Insertion, tube, nasogastric, Diagnostic laparoscopy        Subjective:   Patient seen bedside on 3C, resting comfortably in bed, AAOx3, NAD, conversational.  Denies pain.  Denies nausea.  NGT in place and to suction with small amount of bilious output in cannister.  Magaña in place with darlene colored urine.  Has not yet attempted OOB/Ambulation.  -BM, -Flatus.    Objective:  Vitals: T(F): 98.1 (11-11-21 @ 18:35), Max: 98.5 (11-10-21 @ 22:22)  HR: 64 (11-11-21 @ 18:35)  BP: 140/63 (11-11-21 @ 18:35) (140/63 - 193/77)  RR: 18 (11-11-21 @ 18:35)  SpO2: 100% (11-11-21 @ 14:00)  Vent Settings:     In:   11-10-21 @ 07:01  -  11-11-21 @ 07:00  --------------------------------------------------------  IN: 800 mL    11-11-21 @ 07:01  -  11-11-21 @ 20:11  --------------------------------------------------------  IN: 450 mL      IV Fluids: dextrose 5% + sodium chloride 0.9%. 1000 milliLiter(s) (75 mL/Hr) IV Continuous <Continuous>      Out:   11-10-21 @ 07:01  -  11-11-21 @ 07:00  --------------------------------------------------------  OUT: 300 mL    11-11-21 @ 07:01  -  11-11-21 @ 20:11  --------------------------------------------------------  OUT: 0 mL      EBL:     Voided Urine:   11-10-21 @ 07:01  -  11-11-21 @ 07:00  --------------------------------------------------------  OUT: 300 mL    11-11-21 @ 07:01  -  11-11-21 @ 20:11  --------------------------------------------------------  OUT: 0 mL       NG Tube:   11-10-21 @ 07:01  -  11-11-21 @ 07:00  --------------------------------------------------------  OUT: 300 mL        Physical Examination:  General Appearance: NAD  Heart: RR  Lungs: Unlabored breathing at rest  Abdomen:  Soft, appropriately tender, nondistended. No rigidity, guarding, or rebound tenderness. Abdominal incision sites with overlying gauze and Tegaderm, c/d/i and without strikethrough  MSK/Extremities: Warm & well-perfused. Peripheral pulses intact.  Skin: Warm, dry. No jaundice.       Medications: [Standing]  dextrose 5% + sodium chloride 0.9%. 1000 milliLiter(s) IV Continuous <Continuous>  enoxaparin Injectable 40 milliGRAM(s) SubCutaneous daily  pantoprazole  Injectable 40 milliGRAM(s) IV Push daily  verapamil 60 milliGRAM(s) Oral two times a day    Medications: [PRN]  dextrose 5% + sodium chloride 0.9%. 1000 milliLiter(s) IV Continuous <Continuous>  enoxaparin Injectable 40 milliGRAM(s) SubCutaneous daily  pantoprazole  Injectable 40 milliGRAM(s) IV Push daily  verapamil 60 milliGRAM(s) Oral two times a day      DVT PROPHYLAXIS: enoxaparin Injectable 40 milliGRAM(s) SubCutaneous daily    GI PROPHYLAXIS: pantoprazole  Injectable 40 milliGRAM(s) IV Push daily    ANTICOAGULATION:   ANTIBIOTICS:        Labs:                        13.5   14.68 )-----------( 212      ( 11 Nov 2021 13:59 )             41.7     11-11    141  |  102  |  19  ----------------------------<  81  4.2   |  20  |  0.8    Ca    8.4<L>      11 Nov 2021 13:59  Phos  3.9     11-11  Mg     2.1     11-11    TPro  6.6  /  Alb  4.4  /  TBili  0.9  /  DBili  x   /  AST  26  /  ALT  17  /  AlkPhos  65  11-10    PT/INR - ( 10 Nov 2021 10:45 )   PT: 12.00 sec;   INR: 1.04 ratio         PTT - ( 10 Nov 2021 10:45 )  PTT:27.5 sec        Imaging:  No post-op imaging studies    Assessment:  75F POD 0 s/p diagnostic laparoscopy, lysis of adhesive bands causing SBO.    Plan:  - Monitor vitals  - Monitor NGT, Magaña outputs, possible removal 11/12  - Monitor post-op labs and replete as necessary  - Monitor for bowel function  - Continue Pain Medications if necessary  - Encourage ambulation as tolerated  - DVT and GI Prophylaxis  - Monitor wound and dressing for changes, redress as needed.      Date/Time: 11-11-21 @ 20:11

## 2021-11-11 NOTE — CONSULT NOTE ADULT - ASSESSMENT
IMPRESSION: Rehab of gait dysfunction / s/p lap HELLEN for SBO    PRECAUTIONS: [   ] Cardiac  [   ] Respiratory  [   ] Seizures [   ] Contact Isolation  [   ] Droplet Isolation  [   ] Other    Weight Bearing Status:     RECOMMENDATION:    Out of Bed to Chair     DVT/Decubiti Prophylaxis    REHAB PLAN:     [   x ] Bedside P/T 3-5 times a week   [    ]   Bedside O/T  2-3 times a week             [    ] Speech Therapy               [    ]  No Rehab Therapy Indicated   Conditioning/ROM                                    ADL  Bed Mobility                                               Conditioning/ROM  Transfers                                                     Bed Mobility  Sitting /Standing Balance                         Transfers                                        Gait Training                                               Sitting/Standing Balance  Stair Training [   ]Applicable                    Home equipment Eval                                                                        Splinting  [   ] Only      GOALS:   ADL   [    ]   Independent                    Transfers  [  x  ] Independent                          Ambulation  [  x  ] Independent     [  x   ] With device                            [    ]  CG                                                         [    ]  CG                                                                  [    ] CG                            [    ] Min A                                                   [    ] Min A                                                              [    ] Min  A          DISCHARGE PLAN:   [    ]  Good candidate for Intensive Rehabilitation/Hospital based                                             Will tolerate 3hrs Intensive Rehab Daily                                       [     ]  Short Term Rehab in Skilled Nursing Facility                                       [  x   ]  Home with Outpatient or  services                                         [     ]  Possible Candidate for Intensive Hospital based Rehab

## 2021-11-11 NOTE — PROGRESS NOTE ADULT - SUBJECTIVE AND OBJECTIVE BOX
GENERAL SURGERY PROGRESS NOTE    Patient: VIRGIL ZACARIAS , 75y ([birth year])Female   MRN: [id number]  Location: [address]  Visit: 11-10-21 Inpatient  Date: 21 @ 09:27      Procedure/Dx/Injuries: fluid-filled distended small bowel loop in the right lower quadrant, likely a distal ileal loop with distal transition to collapsed terminal ileum and concurrent proximal transition point in the same location consistent with a closed loop obstruction.     Events of past 24 hours: added on for OR today    PAST MEDICAL & SURGICAL HISTORY:  SVT (supraventricular tachycardia)    History of Mohs surgery for squamous cell carcinoma of skin    History of cholecystectomy    H/O:     History of intraocular lens implant  b/l        Vitals:   T(F): 97.1 (21 @ 08:58), Max: 98.5 (11-10-21 @ 22:22)  HR: 62 (21 @ 08:58)  BP: 175/72 (21 @ 08:58)  RR: 18 (21 @ 08:58)  SpO2: 97% (21 @ 08:58)      Diet, NPO:   Except Medications      Fluids: lactated ringers.: Solution, 1000 milliLiter(s) infuse at 125 mL/Hr      I & O's:    11-10-21 @ 07:01  -  21 @ 07:00  --------------------------------------------------------  IN:    Lactated Ringers: 800 mL  Total IN: 800 mL    OUT:    Nasogastric/Oral tube (mL): 300 mL  Total OUT: 300 mL    Total NET: 500 mL          PHYSICAL EXAM:  General Appearance: AAOX3, NAD, with NGT to suction in place  Heart: RRR  Lungs: CTAX2  Abdomen: moderate tenderness in RLQ and LLQ, no rebound, no guarding, non distended  MSK/Extremities: mobile B/L lower extremities, no edema    MEDICATIONS  (STANDING):  aspirin  chewable 81 milliGRAM(s) Oral daily  enoxaparin Injectable 40 milliGRAM(s) SubCutaneous daily  lactated ringers. 1000 milliLiter(s) (125 mL/Hr) IV Continuous <Continuous>  pantoprazole  Injectable 40 milliGRAM(s) IV Push daily  tamsulosin 0.4 milliGRAM(s) Oral at bedtime  verapamil 60 milliGRAM(s) Oral two times a day    MEDICATIONS  (PRN):      DVT PROPHYLAXIS: enoxaparin Injectable 40 milliGRAM(s) SubCutaneous daily    GI PROPHYLAXIS: pantoprazole  Injectable 40 milliGRAM(s) IV Push daily    ANTICOAGULATION:   ANTIBIOTICS:            LAB/STUDIES:  Labs:  CAPILLARY BLOOD GLUCOSE                              12.3   8.77  )-----------( 219      ( 10 Nov 2021 23:33 )             37.4       Auto Neutrophil %: 76.3 % (11-10-21 @ 23:33)  Auto Immature Granulocyte %: 0.2 % (11-10-21 @ 23:33)  Auto Neutrophil %: 86.1 % (11-10-21 @ 10:45)  Auto Immature Granulocyte %: 0.1 % (11-10-21 @ 10:45)    11-10    139  |  102  |  17  ----------------------------<  99  4.9   |  22  |  0.6<L>      Calcium, Total Serum: 8.9 mg/dL (11-10-21 @ 23:33)      LFTs:             6.6  | 0.9  | 26       ------------------[65      ( 10 Nov 2021 10:45 )  4.4  | x    | 17          Lipase:x      Amylase:x         Lactate, Blood: 0.8 mmol/L (11-10-21 @ 10:45)  Lactate, Blood: 1.0 mmol/L (11-10-21 @ 00:51)      Coags:     12.00  ----< 1.04    ( 10 Nov 2021 10:45 )     27.5                            IMAGING:    < from: CT Abdomen and Pelvis w/ Oral Cont and w/ IV Cont (11.10.21 @ 18:00) >    IMPRESSION:    Findings are consistent with high-grade mechanical small bowel obstruction in the region of the distal ileum. Compared with the earlier study of 11/10/2021, there is increasing small bowel distention. The findings are again compatible with a closed loop obstruction.    Callback was requested.    --- End of Report ---    < end of copied text >    < from: CT Abdomen and Pelvis w/ Oral Cont and w/ IV Cont (11.10.21 @ 18:00) >    *** ADDENDUM 11/10/2021  ***    The CT scans from 5:25 AM and 5:33 PM were reviewed with Dr. Segura in person at 8:40 PM. It is noted that the small bowel loops in the right lower quadrant show no significant signs of acute bowel ischemia, despite obstruction. There is contrast enhancement of the bowel wall and no significant bowel wall thickening. A small amount of free fluid is noted in the lower pelvis.    < end of copied text >      ACCESS/ DEVICES:  [x ] Peripheral IV  [ ] Central Venous Line	[ ] R	[ ] L	[ ] IJ	[ ] Fem	[ ] SC	Placed:   [ ] Arterial Line		[ ] R	[ ] L	[ ] Fem	[ ] Rad	[ ] Ax	Placed:   [ ] PICC:					[ ] Mediport  [ ] Urinary Catheter,  Date Placed:   [ ] Chest tube: [ ] Right, [ ] Left  [ ] MIKE/Aubrey Drains             GENERAL SURGERY PROGRESS NOTE    Patient: VIRGIL ZACARIAS , 75y ([birth year])Female   MRN: [id number]  Location: [address]  Visit: 11-10-21 Inpatient  Date: 21 @ 09:27      Procedure/Dx/Injuries: fluid-filled distended small bowel loop in the right lower quadrant, likely a distal ileal loop with distal transition to collapsed terminal ileum and concurrent proximal transition point in the same location consistent with a closed loop obstruction.     Events of past 24 hours: added on for OR today, midline placed    PAST MEDICAL & SURGICAL HISTORY:  SVT (supraventricular tachycardia)    History of Mohs surgery for squamous cell carcinoma of skin    History of cholecystectomy    H/O:     History of intraocular lens implant  b/l        Vitals:   T(F): 97.1 (21 @ 08:58), Max: 98.5 (11-10-21 @ 22:22)  HR: 62 (21 @ 08:58)  BP: 175/72 (21 @ 08:58)  RR: 18 (21 @ 08:58)  SpO2: 97% (21 @ 08:58)      Diet, NPO:   Except Medications      Fluids: lactated ringers.: Solution, 1000 milliLiter(s) infuse at 125 mL/Hr      I & O's:    11-10-21 @ 07:01  -  21 @ 07:00  --------------------------------------------------------  IN:    Lactated Ringers: 800 mL  Total IN: 800 mL    OUT:    Nasogastric/Oral tube (mL): 300 mL  Total OUT: 300 mL    Total NET: 500 mL          PHYSICAL EXAM:  General Appearance: AAOX3, NAD, with NGT to suction in place  Heart: RRR  Lungs: CTAX2  Abdomen: moderate tenderness in RLQ and LLQ, no rebound, no guarding, non distended  MSK/Extremities: mobile B/L lower extremities, no edema    MEDICATIONS  (STANDING):  aspirin  chewable 81 milliGRAM(s) Oral daily  enoxaparin Injectable 40 milliGRAM(s) SubCutaneous daily  lactated ringers. 1000 milliLiter(s) (125 mL/Hr) IV Continuous <Continuous>  pantoprazole  Injectable 40 milliGRAM(s) IV Push daily  tamsulosin 0.4 milliGRAM(s) Oral at bedtime  verapamil 60 milliGRAM(s) Oral two times a day    MEDICATIONS  (PRN):      DVT PROPHYLAXIS: enoxaparin Injectable 40 milliGRAM(s) SubCutaneous daily    GI PROPHYLAXIS: pantoprazole  Injectable 40 milliGRAM(s) IV Push daily    ANTICOAGULATION:   ANTIBIOTICS:            LAB/STUDIES:  Labs:  CAPILLARY BLOOD GLUCOSE                              12.3   8.77  )-----------( 219      ( 10 Nov 2021 23:33 )             37.4       Auto Neutrophil %: 76.3 % (11-10-21 @ 23:33)  Auto Immature Granulocyte %: 0.2 % (11-10-21 @ 23:33)  Auto Neutrophil %: 86.1 % (11-10-21 @ 10:45)  Auto Immature Granulocyte %: 0.1 % (11-10-21 @ 10:45)    11-10    139  |  102  |  17  ----------------------------<  99  4.9   |  22  |  0.6<L>      Calcium, Total Serum: 8.9 mg/dL (11-10-21 @ 23:33)      LFTs:             6.6  | 0.9  | 26       ------------------[65      ( 10 Nov 2021 10:45 )  4.4  | x    | 17          Lipase:x      Amylase:x         Lactate, Blood: 0.8 mmol/L (11-10-21 @ 10:45)  Lactate, Blood: 1.0 mmol/L (11-10-21 @ 00:51)      Coags:     12.00  ----< 1.04    ( 10 Nov 2021 10:45 )     27.5                            IMAGING:    < from: CT Abdomen and Pelvis w/ Oral Cont and w/ IV Cont (11.10.21 @ 18:00) >    IMPRESSION:    Findings are consistent with high-grade mechanical small bowel obstruction in the region of the distal ileum. Compared with the earlier study of 11/10/2021, there is increasing small bowel distention. The findings are again compatible with a closed loop obstruction.    Callback was requested.    --- End of Report ---    < end of copied text >    < from: CT Abdomen and Pelvis w/ Oral Cont and w/ IV Cont (11.10.21 @ 18:00) >    *** ADDENDUM 11/10/2021  ***    The CT scans from 5:25 AM and 5:33 PM were reviewed with Dr. Segura in person at 8:40 PM. It is noted that the small bowel loops in the right lower quadrant show no significant signs of acute bowel ischemia, despite obstruction. There is contrast enhancement of the bowel wall and no significant bowel wall thickening. A small amount of free fluid is noted in the lower pelvis.    < end of copied text >      ACCESS/ DEVICES:  [x ] Peripheral IV  [ ] Central Venous Line	[ ] R	[ ] L	[ ] IJ	[ ] Fem	[ ] SC	Placed:   [ ] Arterial Line		[ ] R	[ ] L	[ ] Fem	[ ] Rad	[ ] Ax	Placed:   [ ] PICC:					[ ] Mediport  [ ] Urinary Catheter,  Date Placed:   [ ] Chest tube: [ ] Right, [ ] Left  [ ] MIKE/Aubrey Drains

## 2021-11-11 NOTE — CHART NOTE - NSCHARTNOTEFT_GEN_A_CORE
PACU ANESTHESIA ADMISSION NOTE      Procedure: Insertion, tube, nasogastric    Diagnostic laparoscopy      Post op diagnosis:  Small bowel obstruction due to adhesions        ____  Intubated  TV:______       Rate: ______      FiO2: ______    __x__  Patent Airway    __x__  Full return of protective reflexes    __x__  Full recovery from anesthesia / back to baseline status    Vitals:  T(C): 36.8 (11-11-21 @ 10:30), Max: 36.9 (11-10-21 @ 22:22)  HR: 69 (11-11-21 @ 10:30) (54 - 70)  BP: 175/72 (11-11-21 @ 10:30) (145/- - 193/77)  RR: 18 (11-11-21 @ 10:30) (18 - 18)  SpO2: 96% (11-11-21 @ 10:30) (96% - 97%)    Mental Status:  __x__ Awake   ___x__ Alert   _____ Drowsy   _____ Sedated    Nausea/Vomiting:  __x__ NO  ______Yes,   See Post - Op Orders          Pain Scale (0-10):  _____    Treatment: ____ None    __x__ See Post - Op/PCA Orders    Post - Operative Fluids:   ____ Oral   __x__ See Post - Op Orders    Plan: Discharge:   ____Home       __X___Floor     _____Critical Care    _____  Other:_________________    Comments: Patient had smooth intraoperative event, no anesthesia complication.  PACU Vital signs: HR: 73           BP:   148     /  68        RR: 18            O2 Sat:  100     %     Temp 97.4f

## 2021-11-11 NOTE — BRIEF OPERATIVE NOTE - OPERATION/FINDINGS
small bowel obstruction due to 2 adhesive bands in RLQ. small bowel viable, no other adhesions identified.

## 2021-11-11 NOTE — CONSULT NOTE ADULT - SUBJECTIVE AND OBJECTIVE BOX
HPI:  75 year old female with PMHX of SVT on verapamil, recurrent episodes of colitis f/u by GI, PSHx of , and laparoscopic cholecystectomy. Presents to ED with abdominal pain for 1 day. Patient states diffuse, continuous, moderate to severe abdominal pain,  associated with multiple episodes of bilious emesis. Pt denies CP, SOB, diarrhea, fever, chills, urinary symptoms. had 3 BM yesterday. In ED, CT scan showed a fluid-filled distended small bowel loop in the right lower quadrant, likely a distal ileal loop with distal transition to collapsed terminal ileum and concurrent proximal transition point in the same location consistent with a closed loop obstruction. There is edema and fluid of the associated mesentery. s/p lap HELLEN for SBO on            PAST MEDICAL & SURGICAL HISTORY:  SVT (supraventricular tachycardia)    History of Mohs surgery for squamous cell carcinoma of skin    History of cholecystectomy    H/O:     History of intraocular lens implant  b/l        Hospital Course:    TODAY'S SUBJECTIVE & REVIEW OF SYMPTOMS:     Constitutional WNL   Cardio WNL   Resp WNL   GI abd pain  Heme WNL  Endo WNL  Skin WNL  MSK WNL  Neuro WNL  Cognitive WNL  Psych WNL      MEDICATIONS  (STANDING):  dextrose 5% + sodium chloride 0.9%. 1000 milliLiter(s) (75 mL/Hr) IV Continuous <Continuous>  enoxaparin Injectable 40 milliGRAM(s) SubCutaneous daily  pantoprazole  Injectable 40 milliGRAM(s) IV Push daily  verapamil 60 milliGRAM(s) Oral two times a day    MEDICATIONS  (PRN):      FAMILY HISTORY:      Allergies    quinidine (Other)  sulfa drugs (Rash)    Intolerances        SOCIAL HISTORY:    [  ] Etoh  [  ] Smoking  [  ] Substance abuse     Home Environment:  [   ] Home Alone  [x   ] Lives with Family  [   ] Home Health Aid    Dwelling:  [   ] Apartment  [  x ] Private House  [   ] Adult Home  [   ] Skilled Nursing Facility      [   ] Short Term  [   ] Long Term  [ x  ] Stairs       Elevator [   ]    FUNCTIONAL STATUS PTA: (Check all that apply)  Ambulation: [x    ]Independent    [   ] Dependent     [   ] Non-Ambulatory  Assistive Device: [   ] SA Cane  [   ]  Q Cane  [   ] Walker  [   ]  Wheelchair  ADL : [x   ] Independent  [    ]  Dependent       Vital Signs Last 24 Hrs  T(C): 35.9 (2021 14:50), Max: 36.9 (10 Nov 2021 22:22)  T(F): 96.7 (2021 14:50), Max: 98.5 (10 Nov 2021 22:22)  HR: 70 (2021 14:50) (54 - 74)  BP: 146/62 (2021 14:50) (142/62 - 193/77)  BP(mean): 65 (10 Nov 2021 22:22) (65 - 65)  RR: 18 (2021 14:50) (14 - 20)  SpO2: 100% (2021 14:00) (96% - 100%)      PHYSICAL EXAM: Awake & Alert  GENERAL: NAD  HEAD:  Normocephalic  CHEST/LUNG: Clear   HEART: S1S2+  ABDOMEN: Soft, Nontender  EXTREMITIES:  no calf tenderness    NERVOUS SYSTEM:  Cranial Nerves 2-12 intact [   ] Abnormal  [   ]  ROM: WFL all extremities [ x  ]  Abnormal [   ]  Motor Strength: WFL all extremities  [ x  ]  Abnormal [   ]  Sensation: intact to light touch [  x ] Abnormal [   ]    FUNCTIONAL STATUS:  Bed Mobility: Independent [   ]  Supervision [   ]  Needs Assistance [  x ]  N/A [   ]  Transfers: Independent [   ]  Supervision [   ]  Needs Assistance [  x ]  N/A [   ]   Ambulation: Independent [   ]  Supervision [   ]  Needs Assistance [   ]  N/A [   ]  ADL: Independent [   ] Requires Assistance [   ] N/A [   ]      LABS:                        13.5   14.68 )-----------( 212      ( 2021 13:59 )             41.7         141  |  102  |  19  ----------------------------<  81  4.2   |  20  |  0.8    Ca    8.4<L>      2021 13:59  Phos  3.9     -11  Mg     2.1     -11    TPro  6.6  /  Alb  4.4  /  TBili  0.9  /  DBili  x   /  AST  26  /  ALT  17  /  AlkPhos  65  11-10    PT/INR - ( 10 Nov 2021 10:45 )   PT: 12.00 sec;   INR: 1.04 ratio         PTT - ( 10 Nov 2021 10:45 )  PTT:27.5 sec      RADIOLOGY & ADDITIONAL STUDIES:

## 2021-11-12 ENCOUNTER — TRANSCRIPTION ENCOUNTER (OUTPATIENT)
Age: 76
End: 2021-11-12

## 2021-11-12 VITALS
HEART RATE: 54 BPM | OXYGEN SATURATION: 99 % | SYSTOLIC BLOOD PRESSURE: 115 MMHG | RESPIRATION RATE: 18 BRPM | TEMPERATURE: 97 F | DIASTOLIC BLOOD PRESSURE: 56 MMHG

## 2021-11-12 RX ADMIN — PANTOPRAZOLE SODIUM 40 MILLIGRAM(S): 20 TABLET, DELAYED RELEASE ORAL at 11:44

## 2021-11-12 RX ADMIN — ENOXAPARIN SODIUM 40 MILLIGRAM(S): 100 INJECTION SUBCUTANEOUS at 11:45

## 2021-11-12 NOTE — DISCHARGE NOTE PROVIDER - NSDCFUSCHEDAPPT_GEN_ALL_CORE_FT
VIRGIL ZACARIAS ; 02/08/2022 ; [facility] VIRGIL ZACARIAS ; 11/18/2021 ; NPP Gen Surg [id number] VIRGIL Doshi ; 02/08/2022 ; N[facility] [address] [facility]

## 2021-11-12 NOTE — DISCHARGE NOTE NURSING/CASE MANAGEMENT/SOCIAL WORK - PATIENT PORTAL LINK FT
You can access the FollowMyHealth Patient Portal offered by [facility] by registering at the following website: [email or web address]. By joining [facility]’s FollowMyHealth portal, you will also be able to view your health information using other applications (apps) compatible with our system.

## 2021-11-12 NOTE — DISCHARGE NOTE PROVIDER - NSDCACTIVITY_GEN_ALL_CORE
Showering allowed/Walking - Indoors allowed/No heavy lifting/straining/Walking - Outdoors allowed/Follow Instructions Provided by your Surgical Team

## 2021-11-12 NOTE — DISCHARGE NOTE PROVIDER - HOSPITAL COURSE
75 year old female with PMHX of SVT on verapamil, recurrent episodes of colitis f/u by GI, PSHx of , and laparoscopic cholecystectomy. Presents to ED with abdominal pain for 1 day. Patient states diffuse, continuous, moderate to severe abdominal pain,  associated with multiple episodes of non bilious emesis. Pt denies CP, SOB, diarrhea, fever, chills, urinary symptoms. had 3 BM yesterday. In ED, CT scan showed a fluid-filled distended small bowel loop in the right lower quadrant, likely a distal ileal loop with distal transition to collapsed terminal ileum and concurrent proximal transition point in the same location consistent with a closed loop obstruction. There is edema and fluid of the associated mesentery. Physical and clinical evaluation consistent with a SBO, admitted to the surgery service with NGT, IVFs, pain control and repeat CT scan.     Repeat CT scan: Findings are consistent with high-grade mechanical small bowel obstruction in the region of the distal ileum. Compared with the earlier study of 11/10/2021, there is increasing small bowel distention. The findings are again compatible with a closed loop obstruction. Pt was evaluated at bedside, physical exam improved but still presenting with pain and abdominal distention, decision was made to take to the OR due to poor improvement with non-operative management. Pt taken to the OR for Dx laparoscopy, operative impression: SBO due to 2adhesive bands in the RLQ with viable bowel. Post operative course non complicated, NGT was removed, +TOV, tolerated diet, -BM and +gas. Evaluated by Phys: home with outpatient and PT evaluated home, no needs. Pt cleared for DC today.

## 2021-11-12 NOTE — PROGRESS NOTE ADULT - ASSESSMENT
ASSESSMENT:  75F POD 1 s/p diagnostic laparoscopy, lysis of adhesive bands in RLQ causing SBO.  Patient has been doing well postoperatively, +flatus, feels hungry, NGT removed and diet to advanced.  Will remove Magaña after ambulation today.    Plan:  - TOV  - Monitor vitals  - Monitor post-op labs and replete as necessary  - Monitor for bowel function  - Continue Pain Medications if necessary  - Encourage ambulation as tolerated  - DVT and GI Prophylaxis  - Monitor wound and dressing for changes, redress as needed.    Pedro Loyd MD  General Surgery PGY-1  BLUE TEAM S[facility]RA [id number]

## 2021-11-12 NOTE — PHYSICAL THERAPY INITIAL EVALUATION ADULT - PLANNED THERAPY INTERVENTIONS, PT EVAL
Patient independent in bed mobility, transfers and ambulation. No PT interventions needed at this time. Contact PT if status changes.

## 2021-11-12 NOTE — PROGRESS NOTE ADULT - SUBJECTIVE AND OBJECTIVE BOX
GENERAL SURGERY PROGRESS NOTE    Events of past 24 hours:   POD 1 s/p diagnostic lap, lysis of adhesive bands.  Patient seen bedside this AM, NAD, denies pain.  +Flatus, -BM.  NGT in place with minimal to no output overnight, removed bedside.  Magaña in place with darlene colored urine in collection bag.  Patient seen by PT yesterday and +OOB.    Vitals:   T(F): 97 (11-12-21 @ 05:38), Max: 98.2 (11-11-21 @ 10:30)  HR: 55 (11-12-21 @ 05:38) (55 - 83)  BP: 105/53 (11-12-21 @ 05:38) (105/53 - 175/72)  RR: 18 (11-12-21 @ 05:38)  SpO2: 100% (11-11-21 @ 14:00) (96% - 100%)      Diet, Full Liquid      Fluids:     I & O's:    11-11-21 @ 07:01  -  11-12-21 @ 07:00  --------------------------------------------------------  IN:    dextrose 5% + sodium chloride 0.9%: 450 mL  Total IN: 450 mL    OUT:    Voided (mL): 600 mL  Total OUT: 600 mL    Total NET: -150 mL            PHYSICAL EXAM:  General Appearance: NAD  Heart: RR  Lungs: Unlabored breathing at rest  Abdomen:  Soft, appropriately tender, nondistended. No guarding or rebound. Laparoscopic port incision sites with overlying gauze and Tegaderm, c/d/i and without strikethrough  MSK/Extremities: Warm & well-perfused.   Skin: Warm, dry. No jaundice.       MEDICATIONS  (STANDING):  dextrose 5% + sodium chloride 0.9%. 1000 milliLiter(s) (75 mL/Hr) IV Continuous <Continuous>  enoxaparin Injectable 40 milliGRAM(s) SubCutaneous daily  pantoprazole  Injectable 40 milliGRAM(s) IV Push daily  verapamil 60 milliGRAM(s) Oral two times a day    MEDICATIONS  (PRN):        LAB/STUDIES:                          13.5   14.68 )-----------( 212      ( 11 Nov 2021 13:59 )             41.7       11-11    141  |  102  |  19  ----------------------------<  81  4.2   |  20  |  0.8    Ca    8.4<L>      11 Nov 2021 13:59  Phos  3.9     11-11  Mg     2.1     11-11    TPro  6.6  /  Alb  4.4  /  TBili  0.9  /  DBili  x   /  AST  26  /  ALT  17  /  AlkPhos  65  11-10                  PT/INR - ( 10 Nov 2021 10:45 )   PT: 12.00 sec;   INR: 1.04 ratio         PTT - ( 10 Nov 2021 10:45 )  PTT:27.5 sec    Lactate Trend  11-11 @ 07:52 Lactate:1.2   11-10 @ 10:45 Lactate:0.8   11-10 @ 00:51 Lactate:1.0             CAPILLARY BLOOD GLUCOSE      POCT Blood Glucose.: 89 mg/dL (11 Nov 2021 10:32)

## 2021-11-12 NOTE — PHYSICAL THERAPY INITIAL EVALUATION ADULT - ADDITIONAL COMMENTS
Zuri lives with  in private home with 12 steps to bedroom. Was indepednent in ADL's and ambulation without assistive device

## 2021-11-12 NOTE — PHYSICAL THERAPY INITIAL EVALUATION ADULT - PERTINENT HX OF CURRENT PROBLEM, REHAB EVAL
75 year old female with PMHX of SVT on verapamil, recurrent episodes of colitis f/u by GI, PSHx of , and laparoscopic cholecystectomy. Presents to ED with abdominal pain for 1 day. Patient states diffuse, continuous, moderate to severe abdominal pain,  associated with multiple episodes of bilious emesis. Pt denies CP, SOB, diarrhea, fever, chills, urinary symptoms. had 3 BM yesterday. In ED, CT scan showed a fluid-filled distended small bowel loop in the right lower quadrant,

## 2021-11-12 NOTE — DISCHARGE NOTE PROVIDER - NSDCCPTREATMENT_GEN_ALL_CORE_FT
PRINCIPAL PROCEDURE  Procedure: Diagnostic laparoscopy  Findings and Treatment: small bowel obstruction due to 2 adhesive bands in RLQ. small bowel viable, no other adhesions identified.

## 2021-11-12 NOTE — PHYSICAL THERAPY INITIAL EVALUATION ADULT - GENERAL OBSERVATIONS, REHAB EVAL
Chart reviewed, pt unavailable, at OR. Unable to attempt IE, will f/u as sandy.
Patient encountered sitting in chair. Agreed to participate in therapy.

## 2021-11-12 NOTE — DISCHARGE NOTE PROVIDER - NSDCMRMEDTOKEN_GEN_ALL_CORE_FT
Aspir 81 oral delayed release tablet: 1 tab(s) orally once a day  Calcium 600+D:   Flomax 0.4 mg oral capsule: 1 cap(s) orally once a day  floristat:   verapamil 120 mg oral tablet: 1 tab(s) orally once a day  Vitamin B12:   Vitamin B6:   Vitamin D3:

## 2021-11-12 NOTE — DISCHARGE NOTE PROVIDER - NSDCCPCAREPLAN_GEN_ALL_CORE_FT
PRINCIPAL DISCHARGE DIAGNOSIS  Diagnosis: SBO (small bowel obstruction)  Assessment and Plan of Treatment: s/p dx laparoscopy

## 2021-11-12 NOTE — PROGRESS NOTE ADULT - ATTENDING COMMENTS
Patient seen and examined with surgery team on rounds and discussed management plans with patient Post op doing well Magaña and NG removed + flatus Diet advanced wounds clean. Will advance to soft diet in afternoon If Ok then DC home tonight and FU as outpatient next week
Patient seen and examined with surgery team on rounds in AM and discussed management plans for OR for diagnostic laparoscopy and indicated procedure..

## 2021-11-12 NOTE — DISCHARGE NOTE PROVIDER - NSDCFUADDINST_GEN_ALL_CORE_FT
Pt can progress diet as tolerated. Ambulate as tolerated and continue with incentive spirometry. No heavy lifting (>10lbs) for the next 6weeks. Please come to the ED if any of the following present: fever, pain not controlled with medication or intractable vomiting. Can take over the counter medication for pain: tylenol 650mg PO q6hrs, ibuprofen 400mg q6hrs for pain.

## 2021-11-12 NOTE — DISCHARGE NOTE PROVIDER - CARE PROVIDER_API CALL
Nadine Segura  General Surgery  [address] [place] [id number]  Phone: [phone]  Fax: [phone]  Follow Up Time: 1 week

## 2021-11-15 ENCOUNTER — APPOINTMENT (OUTPATIENT)
Dept: CARDIOLOGY | Facility: CLINIC | Age: 76
End: 2021-11-15

## 2021-11-17 LAB
COVID-19 NUCLEOCAPSID GAM AB INTERP: NEGATIVE — SIGNIFICANT CHANGE UP
COVID-19 NUCLEOCAPSID TOTAL GAM ANTIBODY RESULT: 0.07 INDEX — SIGNIFICANT CHANGE UP
SARS-COV-2 IGG+IGM SERPL QL IA: 0.07 INDEX — SIGNIFICANT CHANGE UP
SARS-COV-2 IGG+IGM SERPL QL IA: NEGATIVE — SIGNIFICANT CHANGE UP

## 2021-11-18 ENCOUNTER — APPOINTMENT (OUTPATIENT)
Dept: SURGERY | Facility: CLINIC | Age: 76
End: 2021-11-18
Payer: MEDICARE

## 2021-11-18 VITALS
OXYGEN SATURATION: 99 % | HEART RATE: 57 BPM | WEIGHT: 122 LBS | TEMPERATURE: 96.9 F | BODY MASS INDEX: 23.95 KG/M2 | HEIGHT: 60 IN

## 2021-11-18 VITALS — DIASTOLIC BLOOD PRESSURE: 78 MMHG | SYSTOLIC BLOOD PRESSURE: 121 MMHG

## 2021-11-18 DIAGNOSIS — Z87.19 PERSONAL HISTORY OF OTHER DISEASES OF THE DIGESTIVE SYSTEM: ICD-10-CM

## 2021-11-18 PROCEDURE — 99024 POSTOP FOLLOW-UP VISIT: CPT

## 2021-11-18 NOTE — HISTORY OF PRESENT ILLNESS
[de-identified] : 75-year-old female was hospitalized at [facility] with history of small bowel obstruction. CT scan revealed suspected closed-loop obstruction and following observation patient was taken to the operating room and underwent laparoscopic lysis of adhesions removal of the band releasing the obstruction. Patient was discharged in 24 hours later after close monitoring on soft diet. [de-identified] : Some diarrhea initially after discharge which has subsided now. On normal diet and no pain Meds

## 2021-11-18 NOTE — ASSESSMENT
[FreeTextEntry1] : 75-year-old female with a recent history of small bowel obstruction for which she underwent laparoscopic lysis of adhesions and release of obstruction. Patient is clinically doing well with normal GI function and no significant abdominal pain. All dressings were removed and all ports were healing well without signs of infection. Patient is going on vacation  the next 2 days and for which she was told that she can take airline flight without any restriction. Patient may go in  the pool or ocean on her trip. All questions answered regarding the exercise, driving and  ambulation.

## 2021-11-18 NOTE — PHYSICAL EXAM
[Calm] : calm [de-identified] : Healthy looking ambulatory female [de-identified] : Abdomen soft non distended Recent laparoscopic trocar site dressings in place, no tenderness. well healed prior C section scar [de-identified] : mild peripheral edema

## 2021-11-18 NOTE — REASON FOR VISIT
[Post Op: _________] : a [unfilled] post op visit [Spouse] : spouse [FreeTextEntry1] : Posts op visit  from Hospital

## 2021-11-19 DIAGNOSIS — Z85.828 PERSONAL HISTORY OF OTHER MALIGNANT NEOPLASM OF SKIN: ICD-10-CM

## 2021-11-19 DIAGNOSIS — R10.31 RIGHT LOWER QUADRANT PAIN: ICD-10-CM

## 2021-11-19 DIAGNOSIS — K56.50 INTESTINAL ADHESIONS [BANDS], UNSPECIFIED AS TO PARTIAL VERSUS COMPLETE OBSTRUCTION: ICD-10-CM

## 2021-11-19 DIAGNOSIS — I47.1 SUPRAVENTRICULAR TACHYCARDIA: ICD-10-CM

## 2021-11-19 DIAGNOSIS — Z79.82 LONG TERM (CURRENT) USE OF ASPIRIN: ICD-10-CM

## 2021-11-19 DIAGNOSIS — R18.8 OTHER ASCITES: ICD-10-CM

## 2021-11-19 DIAGNOSIS — Z88.2 ALLERGY STATUS TO SULFONAMIDES: ICD-10-CM

## 2021-11-19 DIAGNOSIS — Z88.8 ALLERGY STATUS TO OTHER DRUGS, MEDICAMENTS AND BIOLOGICAL SUBSTANCES STATUS: ICD-10-CM

## 2021-12-16 ENCOUNTER — APPOINTMENT (OUTPATIENT)
Dept: SURGERY | Facility: CLINIC | Age: 76
End: 2021-12-16
Payer: MEDICARE

## 2021-12-16 VITALS
OXYGEN SATURATION: 98 % | WEIGHT: 125 LBS | BODY MASS INDEX: 24.54 KG/M2 | DIASTOLIC BLOOD PRESSURE: 82 MMHG | SYSTOLIC BLOOD PRESSURE: 122 MMHG | HEIGHT: 60 IN | TEMPERATURE: 97.5 F | HEART RATE: 57 BPM

## 2021-12-16 PROCEDURE — 99024 POSTOP FOLLOW-UP VISIT: CPT

## 2021-12-16 NOTE — ASSESSMENT
[FreeTextEntry1] : 75-year-old female with a recent history of small bowel obstruction for which she underwent laparoscopic lysis of adhesions and release of obstruction last month. Patient is clinically doing well with normal GI function now and was told to resume activity as tolerated and return to office if any concern.

## 2021-12-16 NOTE — PHYSICAL EXAM
[Calm] : calm [de-identified] : Healthy looking ambulatory female [de-identified] : Abdomen soft non distended Recent laparoscopic trocar site dressings in place, no tenderness. well healed prior C section scar [de-identified] : mild peripheral edema

## 2021-12-16 NOTE — REASON FOR VISIT
[Post Op: _________] : a [unfilled] post op visit [Spouse] : spouse [FreeTextEntry1] : Pt here for post op visit

## 2021-12-16 NOTE — HISTORY OF PRESENT ILLNESS
[de-identified] : 75-year-old female was hospitalized at [facility] with history of small bowel obstruction. CT scan revealed suspected closed-loop obstruction and following observation patient was taken to the operating room and underwent laparoscopic lysis of adhesions removal of the band releasing the obstruction. Patient was discharged in 24 hours later after close monitoring on soft diet. [de-identified] : Patient doing well and has returned from her trip from [place]. some stomach upset while on trip but has resolved now for last week

## 2022-02-08 ENCOUNTER — APPOINTMENT (OUTPATIENT)
Dept: PLASTIC SURGERY | Facility: CLINIC | Age: 77
End: 2022-02-08
Payer: MEDICARE

## 2022-02-08 PROCEDURE — 99212 OFFICE O/P EST SF 10 MIN: CPT

## 2022-02-08 NOTE — HISTORY OF PRESENT ILLNESS
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 Pt states lesion wasp resent approximately 2 months prior to biopsy 4/28/21. Due to see Dr. Norton for Mohs procedure and is here to discuss reconstruction.\par \par Nonsmoker, nondiabetic\par \par Interval hx (8/18/21). Patient presents today POD#7 s/p reconstruction of left nasal Moh's defect with LTR. Saw her ophthalmologist due to left eye pain post-op, diagnosed with corneal abrasion, used antibiotic eye drops with improvement of symptoms. Doing well otherwise. Denies any significant pain, f/c or bleeding. \par \par Interval hx (8/26/21). Patient presents today POD#15 s/p reconstruction of left nasal Moh's defect with LTR. Doing well with no significant pain, f/c or bleeding. Left eye pain and abrasion resolved. \par \par Interval hx (10/27/21). Patient presents today 2.5 months s/p reconstruction of left nasal Moh's defect with LTR. Happy with results. Applying scar creams.\par \par Interval hx (2/8/22): Pt presents today 6 months s/p reconstruction of left nasal Moh's defect with LTR. Happy with results. Applying scar creams.

## 2022-02-08 NOTE — ASSESSMENT
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 Doing well. \par \par \par as above\par office revision for minor dog ear inferiorly\par \par Wound care instructions given.\par \par Due to COVID 19, pre-visit patient instructions were explained to the patient and their symptoms were checked upon arrival.  \par Masks were used by the health care providers and staff and the examination room was cleaned after the patient visit was completed.\par

## 2022-02-08 NOTE — PHYSICAL EXAM
[de-identified] : well-appearing, NAD [de-identified] : left nasal flap well-healed with good cosmesis

## 2022-04-22 ENCOUNTER — APPOINTMENT (OUTPATIENT)
Dept: PLASTIC SURGERY | Facility: CLINIC | Age: 77
End: 2022-04-22
Payer: MEDICARE

## 2022-04-22 PROCEDURE — 12051 INTMD RPR FACE/MM 2.5 CM/<: CPT

## 2022-04-22 NOTE — PROCEDURE
[FreeTextEntry6] : Patient is a 76 year old female s/p left nasal wound closure after Mohs with LTR here today for scar revision.\par \par The area was prepped and draped in the usual fashion.  Local anesthetic was administered using 1% lidocaine with epinephrine.\par \par Scar was sharply excised.  Area was irrigated copiously.  Complex wound closure was performed in layers.  The wound measured approximately 2 cm.\par \par Sterile dressing applied.  \par \par 0.1 mg kenalog injected\par \par Patient tolerated procedure well and understands post-op instructions.\par \par Sutures Used: 5-0 monocryl, 6-0 prolene\par \par \par Due to COVID 19, pre-visit patient instructions were explained to the patient and their symptoms were checked upon arrival.  \par Masks were used by the health care providers and staff and the examination room was cleaned after the patient visit was completed.\par

## 2022-04-28 ENCOUNTER — APPOINTMENT (OUTPATIENT)
Dept: PLASTIC SURGERY | Facility: CLINIC | Age: 77
End: 2022-04-28
Payer: MEDICARE

## 2022-04-28 DIAGNOSIS — C44.321 SQUAMOUS CELL CARCINOMA OF SKIN OF NOSE: ICD-10-CM

## 2022-04-28 PROCEDURE — 99024 POSTOP FOLLOW-UP VISIT: CPT

## 2022-04-28 NOTE — PHYSICAL EXAM
[de-identified] : well-appearing, NAD [de-identified] : left nasolabial incision healing well, c/d/i, minimal swelling, improved contour, no erythema

## 2022-04-28 NOTE — ASSESSMENT
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 Doing well. \par \par Now POD#6 s/p revision of left nasal recon for dogear deformity. Doing well and happy with results. \par \par - sutures removed, steri strip applied\par - daily Aquaphor\par - may start Scarguard in 1-2 weeks\par - post-op instructions discussed and all questions answered \par - f/u 6 weeks with Dr. Styles \par \par Due to COVID 19, pre-visit patient instructions were explained to the patient and their symptoms were checked upon arrival.  \par Masks were used by the health care providers and staff and the examination room was cleaned after the patient visit was completed.\par

## 2022-04-28 NOTE — HISTORY OF PRESENT ILLNESS
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 Pt states lesion wasp resent approximately 2 months prior to biopsy 4/28/21. Due to see Dr. Norton for Mohs procedure and is here to discuss reconstruction.\par \par Nonsmoker, nondiabetic\par \par Interval hx (8/18/21). Patient presents today POD#7 s/p reconstruction of left nasal Moh's defect with LTR. Saw her ophthalmologist due to left eye pain post-op, diagnosed with corneal abrasion, used antibiotic eye drops with improvement of symptoms. Doing well otherwise. Denies any significant pain, f/c or bleeding. \par \par Interval hx (8/26/21). Patient presents today POD#15 s/p reconstruction of left nasal Moh's defect with LTR. Doing well with no significant pain, f/c or bleeding. Left eye pain and abrasion resolved. \par \par Interval hx (10/27/21). Patient presents today 2.5 months s/p reconstruction of left nasal Moh's defect with LTR. Happy with results. Applying scar creams.\par \par Interval hx (2/8/22): Pt presents today 6 months s/p reconstruction of left nasal Moh's defect with LTR. Happy with results. Applying scar creams.\par \par Interval hx (4/28/22): Pt presents today POD#6 s/p revision of left nasal Moh's reconstruction. Doing well with no significant f/c or drainage. Happy with results.

## 2022-06-09 ENCOUNTER — APPOINTMENT (OUTPATIENT)
Dept: PLASTIC SURGERY | Facility: CLINIC | Age: 77
End: 2022-06-09
Payer: MEDICARE

## 2022-06-09 PROCEDURE — 99212 OFFICE O/P EST SF 10 MIN: CPT

## 2022-06-09 NOTE — PHYSICAL EXAM
[de-identified] : well-appearing, NAD [de-identified] : left nasolabial incision healing well, c/d/i, minimal swelling, improved contour, no erythema

## 2022-06-09 NOTE — ASSESSMENT
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 Doing well. \par \par Now POD#6 s/p revision of left nasal recon for dogear deformity. Doing well and happy with results. \par \par - sutures removed, steri strip applied\par - daily Aquaphor\par - may start Scarguard in 1-2 weeks\par - post-op instructions discussed and all questions answered \par - f/u 6 weeks with Dr. Styles \par \par Due to COVID 19, pre-visit patient instructions were explained to the patient and their symptoms were checked upon arrival.  \par Masks were used by the health care providers and staff and the examination room was cleaned after the patient visit was completed.\par \par \par 6/9/2022\par 6 wks s/p revision of elft nasa lrecon\par \par pt very happy\par results look excellent at this point in the postop process\par \par Wound care instructions given.\par massage\par \par Due to COVID 19, pre-visit patient instructions were explained to the patient and their symptoms were checked upon arrival.  \par Masks were used by the health care providers and staff and the examination room was cleaned after the patient visit was completed.\par

## 2022-08-15 ENCOUNTER — APPOINTMENT (OUTPATIENT)
Dept: CARDIOLOGY | Facility: CLINIC | Age: 77
End: 2022-08-15

## 2022-08-15 VITALS
HEART RATE: 47 BPM | OXYGEN SATURATION: 98 % | TEMPERATURE: 97.3 F | WEIGHT: 123.2 LBS | HEIGHT: 60 IN | BODY MASS INDEX: 24.19 KG/M2 | DIASTOLIC BLOOD PRESSURE: 72 MMHG | SYSTOLIC BLOOD PRESSURE: 118 MMHG

## 2022-08-15 DIAGNOSIS — I47.2 VENTRICULAR TACHYCARDIA: ICD-10-CM

## 2022-08-15 DIAGNOSIS — I49.9 CARDIAC ARRHYTHMIA, UNSPECIFIED: ICD-10-CM

## 2022-08-15 PROCEDURE — 93000 ELECTROCARDIOGRAM COMPLETE: CPT

## 2022-08-15 PROCEDURE — 99213 OFFICE O/P EST LOW 20 MIN: CPT

## 2022-08-15 RX ORDER — TAMSULOSIN HCL 0.4 MG
CAPSULE ORAL
Refills: 0 | Status: DISCONTINUED | COMMUNITY
End: 2022-08-15

## 2022-08-15 RX ORDER — VERAPAMIL HYDROCHLORIDE 180 MG/1
180 CAPSULE, DELAYED RELEASE PELLETS ORAL DAILY
Qty: 90 | Refills: 3 | Status: DISCONTINUED | COMMUNITY
Start: 2020-10-22 | End: 2022-08-15

## 2022-08-18 PROBLEM — I49.9 CARDIAC ARRHYTHMIA, UNSPECIFIED CARDIAC ARRHYTHMIA TYPE: Status: ACTIVE | Noted: 2020-09-16

## 2022-08-18 PROBLEM — I47.2 VENTRICULAR TACHYCARDIA (PAROXYSMAL): Status: ACTIVE | Noted: 2018-07-24

## 2022-08-18 NOTE — ASSESSMENT
[FreeTextEntry1] : Assessment:\par #RVOT PVCs\par - EP study in 1994\par - Per chart, patient has been stable on verapamil and not inducible with stress testing\par - Asymptomatic, no recent palpitations \par #DLD\par - 4/7/22 , TG 72, HDL 94, \par #Sinus bradycardia\par - Likely from verapamil, patient asymptomatic and able to get HR with exercise \par \par Plan:\par - Continue verapamil 120 mg daily \par - Prescription for short acting verapamil given for patient to take PRN if she develops palpitations when traveling and not near a hospital  \par - Counseled patient on diet and exercise \par - Return to clinic in 12 months or sooner PRN

## 2022-08-18 NOTE — HISTORY OF PRESENT ILLNESS
[FreeTextEntry1] : 76F with RVOT PVCs, DLD here for follow-up. \par \par No palpitations. No chest pain, shortness of breath, lightheadedness/dizziness, pre-syncope/syncope, or lower extremity edema. Smart watch, HR goes to  with exercise \par \par \par \par \par \par

## 2022-08-18 NOTE — PHYSICAL EXAM
[Well Developed] : well developed [Well Nourished] : well nourished [No Acute Distress] : no acute distress [Normal Conjunctiva] : normal conjunctiva [No Carotid Bruit] : no carotid bruit [Normal S1, S2] : normal S1, S2 [No Murmur] : no murmur [No Rub] : no rub [No Gallop] : no gallop [Clear Lung Fields] : clear lung fields [Good Air Entry] : good air entry [No Respiratory Distress] : no respiratory distress  [Soft] : abdomen soft [Non Tender] : non-tender [No Masses/organomegaly] : no masses/organomegaly [Normal Bowel Sounds] : normal bowel sounds [Moves all extremities] : moves all extremities [No Focal Deficits] : no focal deficits [Normal Speech] : normal speech [No edema] : no edema [No varicosities] : no varicosities [No chronic venous stasis changes] : no chronic venous stasis changes [No rashes] : no rashes [Present] : present bilaterally

## 2022-09-12 ENCOUNTER — OUTPATIENT (OUTPATIENT)
Dept: OUTPATIENT SERVICES | Facility: HOSPITAL | Age: 77
LOS: 1 days | Discharge: HOME | End: 2022-09-12

## 2022-09-12 ENCOUNTER — RESULT REVIEW (OUTPATIENT)
Age: 77
End: 2022-09-12

## 2022-09-12 DIAGNOSIS — Z90.49 ACQUIRED ABSENCE OF OTHER SPECIFIED PARTS OF DIGESTIVE TRACT: Chronic | ICD-10-CM

## 2022-09-12 DIAGNOSIS — Z12.31 ENCOUNTER FOR SCREENING MAMMOGRAM FOR MALIGNANT NEOPLASM OF BREAST: ICD-10-CM

## 2022-09-12 DIAGNOSIS — Z98.891 HISTORY OF UTERINE SCAR FROM PREVIOUS SURGERY: Chronic | ICD-10-CM

## 2022-09-12 DIAGNOSIS — Z96.1 PRESENCE OF INTRAOCULAR LENS: Chronic | ICD-10-CM

## 2022-09-12 PROCEDURE — 77067 SCR MAMMO BI INCL CAD: CPT | Mod: 26

## 2022-09-12 PROCEDURE — 77063 BREAST TOMOSYNTHESIS BI: CPT | Mod: 26

## 2022-09-13 ENCOUNTER — NON-APPOINTMENT (OUTPATIENT)
Age: 77
End: 2022-09-13

## 2022-09-13 DIAGNOSIS — N64.89 OTHER SPECIFIED DISORDERS OF BREAST: ICD-10-CM

## 2022-09-19 ENCOUNTER — RESULT REVIEW (OUTPATIENT)
Age: 77
End: 2022-09-19

## 2022-09-19 ENCOUNTER — OUTPATIENT (OUTPATIENT)
Dept: OUTPATIENT SERVICES | Facility: HOSPITAL | Age: 77
LOS: 1 days | Discharge: HOME | End: 2022-09-19

## 2022-09-19 ENCOUNTER — NON-APPOINTMENT (OUTPATIENT)
Age: 77
End: 2022-09-19

## 2022-09-19 DIAGNOSIS — R92.8 OTHER ABNORMAL AND INCONCLUSIVE FINDINGS ON DIAGNOSTIC IMAGING OF BREAST: ICD-10-CM

## 2022-09-19 DIAGNOSIS — Z96.1 PRESENCE OF INTRAOCULAR LENS: Chronic | ICD-10-CM

## 2022-09-19 DIAGNOSIS — Z98.891 HISTORY OF UTERINE SCAR FROM PREVIOUS SURGERY: Chronic | ICD-10-CM

## 2022-09-19 DIAGNOSIS — Z90.49 ACQUIRED ABSENCE OF OTHER SPECIFIED PARTS OF DIGESTIVE TRACT: Chronic | ICD-10-CM

## 2022-09-19 PROCEDURE — 76642 ULTRASOUND BREAST LIMITED: CPT | Mod: 26,LT

## 2022-09-19 PROCEDURE — G0279: CPT | Mod: 26

## 2022-09-19 PROCEDURE — 77065 DX MAMMO INCL CAD UNI: CPT | Mod: 26,LT

## 2022-10-12 ENCOUNTER — APPOINTMENT (OUTPATIENT)
Dept: CARDIOLOGY | Facility: CLINIC | Age: 77
End: 2022-10-12

## 2022-10-18 ENCOUNTER — NON-APPOINTMENT (OUTPATIENT)
Age: 77
End: 2022-10-18

## 2022-10-20 ENCOUNTER — APPOINTMENT (OUTPATIENT)
Dept: PLASTIC SURGERY | Facility: CLINIC | Age: 77
End: 2022-10-20

## 2022-10-20 PROCEDURE — 99212 OFFICE O/P EST SF 10 MIN: CPT

## 2022-10-20 NOTE — PHYSICAL EXAM
[de-identified] : well-appearing, NAD [de-identified] : left nasolabial incision healing well, c/d/i, minimal swelling, improved contour, no erythema

## 2022-10-20 NOTE — HISTORY OF PRESENT ILLNESS
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 Pt states lesion wasp resent approximately 2 months prior to biopsy 4/28/21. Due to see Dr. Norton for Mohs procedure and is here to discuss reconstruction.\par \par Nonsmoker, nondiabetic\par \par Interval hx (8/18/21). Patient presents today POD#7 s/p reconstruction of left nasal Moh's defect with LTR. Saw her ophthalmologist due to left eye pain post-op, diagnosed with corneal abrasion, used antibiotic eye drops with improvement of symptoms. Doing well otherwise. Denies any significant pain, f/c or bleeding. \par \par Interval hx (8/26/21). Patient presents today POD#15 s/p reconstruction of left nasal Moh's defect with LTR. Doing well with no significant pain, f/c or bleeding. Left eye pain and abrasion resolved. \par \par Interval hx (10/27/21). Patient presents today 2.5 months s/p reconstruction of left nasal Moh's defect with LTR. Happy with results. Applying scar creams.\par \par Interval hx (2/8/22): Pt presents today 6 months s/p reconstruction of left nasal Moh's defect with LTR. Happy with results. Applying scar creams.\par \par Interval hx (4/28/22): Pt presents today POD#6 s/p revision of left nasal Moh's reconstruction. Doing well with no significant f/c or drainage. Happy with results.

## 2022-10-20 NOTE — ASSESSMENT
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 Doing well. \par \par Now POD#6 s/p revision of left nasal recon for dogear deformity. Doing well and happy with results. \par \par - sutures removed, steri strip applied\par - daily Aquaphor\par - may start Scarguard in 1-2 weeks\par - post-op instructions discussed and all questions answered \par - f/u 6 weeks with Dr. Styles \par \par Due to COVID 19, pre-visit patient instructions were explained to the patient and their symptoms were checked upon arrival.  \par Masks were used by the health care providers and staff and the examination room was cleaned after the patient visit was completed.\par \par \par 6/9/2022\par 6 wks s/p revision of elft nasa lrecon\par \par pt very happy\par results look excellent at this point in the postop process\par \par Wound care instructions given.\par massage\par \par Due to COVID 19, pre-visit patient instructions were explained to the patient and their symptoms were checked upon arrival.  \par Masks were used by the health care providers and staff and the examination room was cleaned after the patient visit was completed.\par \par 10/20/2022\par left nasal and cheek scarring fine\par cont massage/scarguard\par \par pt overall happy and sees improvement\par \par all ?s answered\par \par Due to COVID 19, pre-visit patient instructions were explained to the patient and their symptoms were checked upon arrival.  \par Masks were used by the health care providers and staff and the examination room was cleaned after the patient visit was completed.\par \par

## 2022-11-15 ENCOUNTER — APPOINTMENT (OUTPATIENT)
Dept: OBGYN | Facility: CLINIC | Age: 77
End: 2022-11-15

## 2022-11-15 VITALS — TEMPERATURE: 97.7 F | HEIGHT: 61 IN | BODY MASS INDEX: 23.03 KG/M2 | WEIGHT: 122 LBS

## 2022-11-15 VITALS — DIASTOLIC BLOOD PRESSURE: 70 MMHG | SYSTOLIC BLOOD PRESSURE: 114 MMHG

## 2022-11-15 DIAGNOSIS — Z48.89 ENCOUNTER FOR OTHER SPECIFIED SURGICAL AFTERCARE: ICD-10-CM

## 2022-11-15 DIAGNOSIS — N95.2 POSTMENOPAUSAL ATROPHIC VAGINITIS: ICD-10-CM

## 2022-11-15 DIAGNOSIS — Z78.0 ASYMPTOMATIC MENOPAUSAL STATE: ICD-10-CM

## 2022-11-15 LAB
BILIRUB UR QL STRIP: NEGATIVE
GLUCOSE UR-MCNC: NEGATIVE
HCG UR QL: 0.2 EU/DL
HGB UR QL STRIP.AUTO: NEGATIVE
KETONES UR-MCNC: NEGATIVE
LEUKOCYTE ESTERASE UR QL STRIP: NORMAL
NITRITE UR QL STRIP: NEGATIVE
PH UR STRIP: 5.5
PROT UR STRIP-MCNC: NEGATIVE
SP GR UR STRIP: 1.02

## 2022-11-15 PROCEDURE — 81003 URINALYSIS AUTO W/O SCOPE: CPT | Mod: QW

## 2022-11-15 PROCEDURE — 99214 OFFICE O/P EST MOD 30 MIN: CPT

## 2022-11-15 RX ORDER — CYCLOSPORINE 0.5 MG/ML
0.05 EMULSION OPHTHALMIC
Refills: 0 | Status: COMPLETED | COMMUNITY
End: 2022-11-15

## 2022-11-15 RX ORDER — TRIAMCINOLONE ACETONIDE 0.25 MG/G
0.03 CREAM TOPICAL
Qty: 15 | Refills: 0 | Status: COMPLETED | COMMUNITY
Start: 2022-08-12

## 2022-11-15 RX ORDER — ASPIRIN 81 MG
81 TABLET, DELAYED RELEASE (ENTERIC COATED) ORAL DAILY
Refills: 0 | Status: COMPLETED | COMMUNITY
End: 2022-11-15

## 2022-11-15 RX ORDER — FINASTERIDE 5 MG/1
5 TABLET, FILM COATED ORAL
Refills: 0 | Status: ACTIVE | COMMUNITY

## 2022-11-15 RX ORDER — TAMSULOSIN HYDROCHLORIDE 0.4 MG/1
0.4 CAPSULE ORAL
Qty: 90 | Refills: 3 | Status: COMPLETED | COMMUNITY
End: 2022-11-15

## 2022-11-15 RX ORDER — LACTOBACILLUS ACIDOPHILUS 500MM CELL
CAPSULE ORAL
Refills: 0 | Status: COMPLETED | COMMUNITY
End: 2022-11-15

## 2022-11-15 RX ORDER — CHOLECALCIFEROL (VITAMIN D3) 1250 MCG
1.25 MG CAPSULE ORAL
Refills: 0 | Status: COMPLETED | COMMUNITY
End: 2022-11-15

## 2022-11-15 RX ORDER — CONJUGATED ESTROGENS 0.62 MG/G
0.62 CREAM VAGINAL
Qty: 1 | Refills: 3 | Status: ACTIVE | COMMUNITY
Start: 2022-11-15 | End: 1900-01-01

## 2023-02-13 ENCOUNTER — APPOINTMENT (OUTPATIENT)
Dept: ORTHOPEDIC SURGERY | Facility: CLINIC | Age: 78
End: 2023-02-13
Payer: MEDICARE

## 2023-02-13 PROCEDURE — 99203 OFFICE O/P NEW LOW 30 MIN: CPT | Mod: 25

## 2023-02-13 PROCEDURE — 20610 DRAIN/INJ JOINT/BURSA W/O US: CPT | Mod: LT

## 2023-02-13 PROCEDURE — 73562 X-RAY EXAM OF KNEE 3: CPT | Mod: LT

## 2023-02-13 NOTE — DISCUSSION/SUMMARY
[de-identified] : Mobic 15 mg PO QD PRN was sent to patient's pharmacy to help alleviate their symptoms.  The patient was advised to rest/ice the area.  They may alternate with warm compresses as needed.  The knee conditioning program from the [facility] was given to the patient so they may try that at home.\par \par With the patient's approval, and under sterile technique, I performed a steroid injection today.  See the attached procedure note for further details.  The patient was informed that their next cortisone injection could not be until a minimum of three months from today's date and the patient understands.  Explained to the patient that the full effect of the injection will take 3-5 days to kick in. \par \par The patient will follow-up in 8 weeks for further evaluation.  At that point, if the patient is still in pain, will consider further imaging studies.  All of the patient's questions/concerns were answered in detail.\par \par The patient was seen under the supervision of Dr. Small.

## 2023-02-13 NOTE — HISTORY OF PRESENT ILLNESS
[de-identified] : Patient is a 77-year-old female accompanied by her  who reports to the office for evaluation of her left knee pain that has been aggravating her for the past several days.  She states that she was in [place] walking on uneven grounds where she may have misstepped.  Denies any buckling, locking, or instability.  Walking, up and down stairs, certain range of motion, and palpating certain areas of the knee all aggravate the patient's pain.  Denies any numbness or tingling.

## 2023-02-13 NOTE — PROCEDURE
[Large Joint Injection] : Large joint injection [Left] : of the left [Knee] : knee [Pain] : pain [Alcohol] : alcohol [Sterile technique used] : sterile technique used [____] : [unfilled] [] : Patient tolerated procedure well [Call if redness, pain or fever occur] : call if redness, pain or fever occur [Apply ice for 15min out of every hour for the next 12-24 hours as tolerated] : apply ice for 15 minutes out of every hour for the next 12-24 hours as tolerated

## 2023-02-13 NOTE — IMAGING
[de-identified] : Exam of the left knee is as follows: Minimal effusion noted.  No erythema or ecchymosis.  Able to perform active straight leg raise.  Knee flexion from 0 to 110 degrees with mild pain.  Patellofemoral, medial/lateral joint line tenderness to palpation.  Calf is soft and nontender.  Light touch intact throughout.  Equivocal Samy's.  Mildly antalgic gait.\par \par X-rays taken of the patient's left knee in the office today reveal no obvious fractures, subluxations, or dislocations.  Joint space narrowing along with osteoarthritic changes noted.

## 2023-02-16 ENCOUNTER — APPOINTMENT (OUTPATIENT)
Dept: PLASTIC SURGERY | Facility: CLINIC | Age: 78
End: 2023-02-16
Payer: MEDICARE

## 2023-02-16 PROCEDURE — 99212 OFFICE O/P EST SF 10 MIN: CPT

## 2023-02-16 NOTE — PHYSICAL EXAM
[de-identified] : well-appearing, NAD [de-identified] : left nasolabial incision healing well, c/d/i, minimal swelling, improved contour, no erythema

## 2023-04-07 ENCOUNTER — APPOINTMENT (OUTPATIENT)
Dept: ORTHOPEDIC SURGERY | Facility: CLINIC | Age: 78
End: 2023-04-07
Payer: MEDICARE

## 2023-04-07 PROCEDURE — 99213 OFFICE O/P EST LOW 20 MIN: CPT

## 2023-04-07 NOTE — HISTORY OF PRESENT ILLNESS
[de-identified] : Patient is a 77-year-old female accompanied by her  who reports to the office for subsequent reevaluation of her left knee pain.  She states that she has been doing exercises at home which have been giving her relief.  Certain range of motion and palpating certain areas of the knee aggravate the patient's pain.  Admits the knee might pop in and out of place/buckle on her occasionally.  Denies any numbness or tingling.

## 2023-04-07 NOTE — DISCUSSION/SUMMARY
[de-identified] : Patient may take OTC NSAIDs as needed for pain.  The patient was advised to rest/ice the area and may alternate with warm compresses as needed.  May continue the knee conditioning exercises at home as directed.\par \par Left knee MRI ordered for further evaluation.  Patient was advised to call the office a few days after getting the MRI done to discuss results over the phone.  \par \par Offered gel injections but the patient would like to hold off for now.  She will follow-up in 3 months weeks for further evaluation.  All of the patient's questions/concerns were answered in detail.\par \par The patient was seen under the supervision of Dr. Small.

## 2023-04-07 NOTE — IMAGING
[de-identified] : Left knee exam is as follows: Minimal effusion noted.  No erythema or ecchymosis.  Able to perform active straight leg raise.  Knee flexion from 5 degrees to 120 degrees with mild pain.  Patellofemoral and medial joint line tenderness to palpation.  Calf is soft and nontender.  Positive Samy's.  Light touch intact throughout.  Nonantalgic gait.

## 2023-04-10 ENCOUNTER — APPOINTMENT (OUTPATIENT)
Dept: MRI IMAGING | Facility: CLINIC | Age: 78
End: 2023-04-10
Payer: MEDICARE

## 2023-04-10 PROCEDURE — 73721 MRI JNT OF LWR EXTRE W/O DYE: CPT | Mod: LT,MH

## 2023-05-03 ENCOUNTER — OUTPATIENT (OUTPATIENT)
Dept: OUTPATIENT SERVICES | Facility: HOSPITAL | Age: 78
LOS: 1 days | End: 2023-05-03
Payer: MEDICARE

## 2023-05-03 DIAGNOSIS — Z98.891 HISTORY OF UTERINE SCAR FROM PREVIOUS SURGERY: Chronic | ICD-10-CM

## 2023-05-03 DIAGNOSIS — Z90.49 ACQUIRED ABSENCE OF OTHER SPECIFIED PARTS OF DIGESTIVE TRACT: Chronic | ICD-10-CM

## 2023-05-03 DIAGNOSIS — H90.11 CONDUCTIVE HEARING LOSS, UNILATERAL, RIGHT EAR, WITH UNRESTRICTED HEARING ON THE CONTRALATERAL SIDE: ICD-10-CM

## 2023-05-03 DIAGNOSIS — E80.7 DISORDER OF BILIRUBIN METABOLISM, UNSPECIFIED: ICD-10-CM

## 2023-05-03 DIAGNOSIS — Z96.1 PRESENCE OF INTRAOCULAR LENS: Chronic | ICD-10-CM

## 2023-05-03 DIAGNOSIS — Z00.8 ENCOUNTER FOR OTHER GENERAL EXAMINATION: ICD-10-CM

## 2023-05-03 PROCEDURE — 70480 CT ORBIT/EAR/FOSSA W/O DYE: CPT | Mod: 26

## 2023-05-03 PROCEDURE — 76700 US EXAM ABDOM COMPLETE: CPT | Mod: 26

## 2023-05-03 PROCEDURE — 76700 US EXAM ABDOM COMPLETE: CPT

## 2023-05-03 PROCEDURE — 70480 CT ORBIT/EAR/FOSSA W/O DYE: CPT

## 2023-05-04 DIAGNOSIS — E80.7 DISORDER OF BILIRUBIN METABOLISM, UNSPECIFIED: ICD-10-CM

## 2023-05-04 DIAGNOSIS — H90.11 CONDUCTIVE HEARING LOSS, UNILATERAL, RIGHT EAR, WITH UNRESTRICTED HEARING ON THE CONTRALATERAL SIDE: ICD-10-CM

## 2023-07-06 ENCOUNTER — APPOINTMENT (OUTPATIENT)
Dept: ORTHOPEDIC SURGERY | Facility: CLINIC | Age: 78
End: 2023-07-06
Payer: MEDICARE

## 2023-07-06 PROCEDURE — 99213 OFFICE O/P EST LOW 20 MIN: CPT

## 2023-07-06 NOTE — DISCUSSION/SUMMARY
[de-identified] : Operative versus nonoperative treatment was discussed with the patient in detail.  Patient is interested in surgery.  Dr. Small came to the examination room and explained the surgery.  Patient is interested and his surgical booking  will contact the patient for a surgical date.\par \par She will continue the knee conditioning exercises at home.  She may take OTC NSAIDs as needed for pain.  The patient was advised to rest/ice the area and may alternate with warm compresses as needed.  Patient will follow-up in the OR.  All the patient's questions/concerns were answered in detail.

## 2023-07-06 NOTE — HISTORY OF PRESENT ILLNESS
[de-identified] : Is a 77-year-old female who reports to the office for subsequent reevaluation of her left knee pain.  She had an MRI done electing over the results of that.  Walking, certain range of motion, palpating certain areas of the knee aggravate the patient's pain at times.  Denies any numbness or tingling.  She is not doing formal physical therapy which is given her minimal relief.

## 2023-07-06 NOTE — IMAGING
[de-identified] : Left knee MRI reviewed with the patient in detail.  It revealed the following: \par IMPRESSION:\par 1. Complex lateral meniscal tearing with displaced meniscal flaps posteriorly and severe surrounding synovitis.\par 2. Severe posterior medial meniscal root tearing and severe surrounding synovitis.\par 3. Tricompartmental chondral loss and joint space narrowing with moderate subchondral edema in the medial femoral condyle \par and medial tibial plateau, mild effusion, small popliteal cyst, and mild soft tissue swelling surrounding the knee with mild MCL \par laxity and mild anteromedial soft tissue swelling and bursitis.\par \par Left knee exam is as follows: Minimal effusion noted.  No erythema or ecchymosis.  Able to perform actively leg raise.  Knee flexion from 0 to 110 degrees.  Patellofemoral, medial/lateral joint line tenderness to palpation.  Positive Samy's.  Light touch intact throughout.  Nonantalgic gait.

## 2023-07-11 ENCOUNTER — NON-APPOINTMENT (OUTPATIENT)
Age: 78
End: 2023-07-11

## 2023-07-13 ENCOUNTER — APPOINTMENT (OUTPATIENT)
Dept: ORTHOPEDIC SURGERY | Facility: CLINIC | Age: 78
End: 2023-07-13
Payer: MEDICARE

## 2023-07-13 VITALS
OXYGEN SATURATION: 98 % | DIASTOLIC BLOOD PRESSURE: 84 MMHG | HEART RATE: 51 BPM | SYSTOLIC BLOOD PRESSURE: 138 MMHG | WEIGHT: 123 LBS | BODY MASS INDEX: 22.63 KG/M2 | HEIGHT: 62 IN

## 2023-07-13 PROCEDURE — 73562 X-RAY EXAM OF KNEE 3: CPT | Mod: 50

## 2023-07-13 PROCEDURE — 99204 OFFICE O/P NEW MOD 45 MIN: CPT

## 2023-07-13 RX ORDER — VERAPAMIL HYDROCHLORIDE 120 MG/1
120 TABLET ORAL DAILY
Qty: 90 | Refills: 3 | Status: DISCONTINUED | COMMUNITY
Start: 2023-07-07 | End: 2023-07-13

## 2023-07-13 RX ORDER — VERAPAMIL HYDROCHLORIDE 120 MG/1
120 CAPSULE, EXTENDED RELEASE ORAL DAILY
Qty: 90 | Refills: 3 | Status: ACTIVE | COMMUNITY
Start: 2021-08-06 | End: 1900-01-01

## 2023-07-13 NOTE — HISTORY OF PRESENT ILLNESS
[de-identified] : VIRGIL ZACARIAS is a 77 year  old  F patient that presents today with left knee pain. pt states on February 1 she was walking downstairs while traveling out of the country and felt pain in her left knee. . Pt denies any recent falls. Pain is affecting her sleep. Pt has gone to physical therapy twice a week fo a month. \par

## 2023-07-13 NOTE — PHYSICAL EXAM
[de-identified] : Left knee lateral joint pain and tenderness no medial joitn tenderness Positve Samy neg aris ROM 0-130 degrees\par  [de-identified] : 4 views of knees show some mild KL 2 chnages of OA no major joint line narrowing. \par MRI [id number] previously reviewed which shows both early OA , medial root tear ( probably chronic ) and lateral complex meniscus tear.

## 2023-07-13 NOTE — DISCUSSION/SUMMARY
[de-identified] : 77 year old with early OA no major joint narrowing and what I believe is a new lateral meniscal tear and chronic medial meniscal root tear. \par SHe is better from early PT and one cortisone injection ( although she didn’t think the injection helped) \par However mostly now has night pain and some stiffness with long walks. \par She had an MRI ordered by her primary care who referred her here. \par She and her  travel widely and have several trips planned August and September. \par Plan \par We had a long discussion about the results of menisectomy with arthritis and she has no mechanical symptoms now. I believe the lateral meniscus is the main issue. \par As she doesn't have enough recuperation time for surgery now we will suggest PRP injection in 2 weeks \par Give new PT script and consider arthroscopy down the road when she has 8 weeks to recover if needed. \par \par

## 2023-07-25 ENCOUNTER — APPOINTMENT (OUTPATIENT)
Dept: ORTHOPEDIC SURGERY | Facility: CLINIC | Age: 78
End: 2023-07-25
Payer: SELF-PAY

## 2023-07-25 PROCEDURE — 005KIT: CUSTOM | Mod: 25

## 2023-07-25 PROCEDURE — 0232T NJX PLATELET PLASMA: CPT

## 2023-07-25 NOTE — PROCEDURE
[de-identified] : The patient was counseled again on risks and benefits of Platelet Rich Plasma  injection. In addition the fact that this is an off label use of PRP for OA of the knee was discussed. \par Informed consent was obtained. \par 60 cc of blood was withdrawn from the patient's arm without complication and it was placed in the [employer] Centrifuge at appropriate speed and time for prp separation. \par The PRP was then injected into the left knee after prepping with alcohol and sub Q xylocaine placed at the superolateral portal without complication . \par Patient was counseled to avoid heavy impact activity for 48 hours and then resume normal activities. NO NSAIDS for 2 weeks. \par F/U in 2 months\par \par

## 2023-09-06 ENCOUNTER — APPOINTMENT (OUTPATIENT)
Dept: ORTHOPEDIC SURGERY | Facility: CLINIC | Age: 78
End: 2023-09-06
Payer: MEDICARE

## 2023-09-06 PROCEDURE — 99213 OFFICE O/P EST LOW 20 MIN: CPT

## 2023-09-06 NOTE — DISCUSSION/SUMMARY
[de-identified] : SOme recent improvement in knee pain and stiffness from PRP and PT however only the last few days.  She does not want to pursue arthroscopic partial meniscectomies yet. ' PLan  Continue PRE F/U 2 months to see how she is doing.

## 2023-09-06 NOTE — HISTORY OF PRESENT ILLNESS
[de-identified] : VIRGIL ZACARIAS 77-year female Follow-up for left knee after getting PRP injection on 07/25/23. Pt states she had not seen much difference from the injection until the last few days when she has noted less stiffness and pain. She is at 5 weeks Post injection  Pt continues Physical Therapy twice a week.  She does not want to pursue arthroscopic surgery.

## 2023-09-21 NOTE — PROCEDURE NOTE - ESTIMATED BLOOD LOSS
None
Minimal
on the discharge service for the patient. I have reviewed and made amendments to the documentation where necessary.

## 2023-09-27 DIAGNOSIS — Z00.00 ENCOUNTER FOR GENERAL ADULT MEDICAL EXAMINATION W/OUT ABNORMAL FINDINGS: ICD-10-CM

## 2023-09-28 ENCOUNTER — APPOINTMENT (OUTPATIENT)
Dept: CARDIOLOGY | Facility: CLINIC | Age: 78
End: 2023-09-28
Payer: MEDICARE

## 2023-09-28 VITALS
HEART RATE: 53 BPM | BODY MASS INDEX: 22.82 KG/M2 | OXYGEN SATURATION: 98 % | SYSTOLIC BLOOD PRESSURE: 122 MMHG | DIASTOLIC BLOOD PRESSURE: 72 MMHG | HEIGHT: 62 IN | WEIGHT: 124 LBS

## 2023-09-28 DIAGNOSIS — Z71.89 OTHER SPECIFIED COUNSELING: ICD-10-CM

## 2023-09-28 DIAGNOSIS — Z71.82 EXERCISE COUNSELING: ICD-10-CM

## 2023-09-28 DIAGNOSIS — Z71.3 DIETARY COUNSELING AND SURVEILLANCE: ICD-10-CM

## 2023-09-28 DIAGNOSIS — R00.1 BRADYCARDIA, UNSPECIFIED: ICD-10-CM

## 2023-09-28 DIAGNOSIS — E78.5 HYPERLIPIDEMIA, UNSPECIFIED: ICD-10-CM

## 2023-09-28 PROCEDURE — 93000 ELECTROCARDIOGRAM COMPLETE: CPT

## 2023-09-28 PROCEDURE — 99214 OFFICE O/P EST MOD 30 MIN: CPT

## 2023-09-28 RX ORDER — UBIDECARENONE/VIT E ACET 100MG-5
CAPSULE ORAL DAILY
Refills: 0 | Status: ACTIVE | COMMUNITY

## 2023-09-28 RX ORDER — MELOXICAM 15 MG/1
15 TABLET ORAL DAILY
Qty: 30 | Refills: 1 | Status: DISCONTINUED | COMMUNITY
Start: 2023-07-13 | End: 2023-09-28

## 2023-09-28 RX ORDER — VERAPAMIL HYDROCHLORIDE 40 MG/1
40 TABLET ORAL 3 TIMES DAILY
Qty: 12 | Refills: 1 | Status: DISCONTINUED | COMMUNITY
Start: 2022-08-18 | End: 2023-09-28

## 2023-09-28 RX ORDER — MELOXICAM 15 MG/1
15 TABLET ORAL
Qty: 30 | Refills: 1 | Status: DISCONTINUED | COMMUNITY
Start: 2023-02-13 | End: 2023-09-28

## 2023-09-29 PROBLEM — R00.1 SINUS BRADYCARDIA: Status: ACTIVE | Noted: 2022-08-18

## 2023-09-29 PROBLEM — E78.5 DYSLIPIDEMIA: Status: ACTIVE | Noted: 2022-08-18

## 2023-09-29 PROBLEM — Z71.3 DIETARY COUNSELING: Status: ACTIVE | Noted: 2023-09-29

## 2023-09-29 PROBLEM — Z71.89 COUNSELING ON HEALTH PROMOTION AND DISEASE PREVENTION: Status: ACTIVE | Noted: 2023-09-29

## 2023-10-26 ENCOUNTER — OUTPATIENT (OUTPATIENT)
Dept: OUTPATIENT SERVICES | Facility: HOSPITAL | Age: 78
LOS: 1 days | End: 2023-10-26
Payer: MEDICARE

## 2023-10-26 ENCOUNTER — RESULT REVIEW (OUTPATIENT)
Age: 78
End: 2023-10-26

## 2023-10-26 DIAGNOSIS — Z90.49 ACQUIRED ABSENCE OF OTHER SPECIFIED PARTS OF DIGESTIVE TRACT: Chronic | ICD-10-CM

## 2023-10-26 DIAGNOSIS — Z98.891 HISTORY OF UTERINE SCAR FROM PREVIOUS SURGERY: Chronic | ICD-10-CM

## 2023-10-26 DIAGNOSIS — Z96.1 PRESENCE OF INTRAOCULAR LENS: Chronic | ICD-10-CM

## 2023-10-26 DIAGNOSIS — Z12.31 ENCOUNTER FOR SCREENING MAMMOGRAM FOR MALIGNANT NEOPLASM OF BREAST: ICD-10-CM

## 2023-10-26 PROCEDURE — 77063 BREAST TOMOSYNTHESIS BI: CPT

## 2023-10-26 PROCEDURE — 77067 SCR MAMMO BI INCL CAD: CPT | Mod: 26

## 2023-10-26 PROCEDURE — 77063 BREAST TOMOSYNTHESIS BI: CPT | Mod: 26

## 2023-10-26 PROCEDURE — 77067 SCR MAMMO BI INCL CAD: CPT

## 2023-10-27 DIAGNOSIS — Z12.31 ENCOUNTER FOR SCREENING MAMMOGRAM FOR MALIGNANT NEOPLASM OF BREAST: ICD-10-CM

## 2023-11-01 ENCOUNTER — APPOINTMENT (OUTPATIENT)
Dept: ORTHOPEDIC SURGERY | Facility: CLINIC | Age: 78
End: 2023-11-01
Payer: MEDICARE

## 2023-11-01 DIAGNOSIS — S83.242A OTHER TEAR OF MEDIAL MENISCUS, CURRENT INJURY, LEFT KNEE, INITIAL ENCOUNTER: ICD-10-CM

## 2023-11-01 DIAGNOSIS — M17.12 UNILATERAL PRIMARY OSTEOARTHRITIS, LEFT KNEE: ICD-10-CM

## 2023-11-01 DIAGNOSIS — S83.282A OTHER TEAR OF LATERAL MENISCUS, CURRENT INJURY, LEFT KNEE, INITIAL ENCOUNTER: ICD-10-CM

## 2023-11-01 PROCEDURE — 99213 OFFICE O/P EST LOW 20 MIN: CPT

## 2023-12-04 ENCOUNTER — APPOINTMENT (OUTPATIENT)
Dept: OBGYN | Facility: CLINIC | Age: 78
End: 2023-12-04
Payer: MEDICARE

## 2023-12-04 VITALS — BODY MASS INDEX: 23.03 KG/M2 | WEIGHT: 122 LBS | HEIGHT: 61 IN

## 2023-12-04 VITALS — DIASTOLIC BLOOD PRESSURE: 70 MMHG | SYSTOLIC BLOOD PRESSURE: 128 MMHG

## 2023-12-04 DIAGNOSIS — Z01.419 ENCOUNTER FOR GYNECOLOGICAL EXAMINATION (GENERAL) (ROUTINE) W/OUT ABNORMAL FINDINGS: ICD-10-CM

## 2023-12-04 PROCEDURE — G0101: CPT

## 2023-12-04 PROCEDURE — 81003 URINALYSIS AUTO W/O SCOPE: CPT | Mod: QW

## 2024-01-17 ENCOUNTER — APPOINTMENT (OUTPATIENT)
Dept: ORTHOPEDIC SURGERY | Facility: CLINIC | Age: 79
End: 2024-01-17

## 2024-03-10 PROBLEM — Z71.82 EXERCISE COUNSELING: Status: ACTIVE | Noted: 2023-09-29

## 2024-05-16 ENCOUNTER — NON-APPOINTMENT (OUTPATIENT)
Age: 79
End: 2024-05-16

## 2024-07-10 ENCOUNTER — APPOINTMENT (OUTPATIENT)
Dept: ORTHOPEDIC SURGERY | Facility: CLINIC | Age: 79
End: 2024-07-10
Payer: MEDICARE

## 2024-07-10 DIAGNOSIS — S92.354K NONDISPLACED FRACTURE OF FIFTH METATARSAL BONE, RIGHT FOOT, SUBSEQUENT ENCOUNTER FOR FRACTURE WITH NONUNION: ICD-10-CM

## 2024-07-10 PROCEDURE — 99203 OFFICE O/P NEW LOW 30 MIN: CPT

## 2024-09-12 ENCOUNTER — APPOINTMENT (OUTPATIENT)
Dept: PLASTIC SURGERY | Facility: CLINIC | Age: 79
End: 2024-09-12
Payer: MEDICARE

## 2024-09-12 ENCOUNTER — APPOINTMENT (OUTPATIENT)
Dept: CARDIOLOGY | Facility: CLINIC | Age: 79
End: 2024-09-12
Payer: MEDICARE

## 2024-09-12 VITALS
HEART RATE: 57 BPM | SYSTOLIC BLOOD PRESSURE: 118 MMHG | HEIGHT: 61 IN | DIASTOLIC BLOOD PRESSURE: 74 MMHG | BODY MASS INDEX: 23.41 KG/M2 | WEIGHT: 124 LBS

## 2024-09-12 DIAGNOSIS — Z71.89 OTHER SPECIFIED COUNSELING: ICD-10-CM

## 2024-09-12 DIAGNOSIS — Z01.810 ENCOUNTER FOR PREPROCEDURAL CARDIOVASCULAR EXAMINATION: ICD-10-CM

## 2024-09-12 DIAGNOSIS — I47.29 OTHER VENTRICULAR TACHYCARDIA: ICD-10-CM

## 2024-09-12 DIAGNOSIS — E78.5 HYPERLIPIDEMIA, UNSPECIFIED: ICD-10-CM

## 2024-09-12 PROCEDURE — 99214 OFFICE O/P EST MOD 30 MIN: CPT

## 2024-09-12 PROCEDURE — 99213 OFFICE O/P EST LOW 20 MIN: CPT

## 2024-09-12 PROCEDURE — 93000 ELECTROCARDIOGRAM COMPLETE: CPT

## 2024-09-12 RX ORDER — CHLORDIAZEPOXIDE HYDROCHLORIDE AND CLIDINIUM BROMIDE 5; 2.5 MG/1; MG/1
5-2.5 CAPSULE ORAL DAILY
Refills: 0 | Status: ACTIVE | COMMUNITY

## 2024-09-12 RX ORDER — MINOXIDIL 2.5 MG/1
2.5 TABLET ORAL EVERY OTHER DAY
Refills: 0 | Status: ACTIVE | COMMUNITY

## 2024-09-12 NOTE — PHYSICAL EXAM
[de-identified] : well-appearing, NAD [de-identified] : left nasolabial incision healing well, c/d/i, minimal swelling, improved contour, no erythema

## 2024-09-12 NOTE — ASSESSMENT
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 Doing well.   Now POD#6 s/p revision of left nasal recon for dogear deformity. Doing well and happy with results.   - sutures removed, steri strip applied - daily Aquaphor - may start Scarguard in 1-2 weeks - post-op instructions discussed and all questions answered  - f/u 6 weeks with Dr. Styles   Due to COVID 19, pre-visit patient instructions were explained to the patient and their symptoms were checked upon arrival.   Masks were used by the health care providers and staff and the examination room was cleaned after the patient visit was completed.   6/9/2022 6 wks s/p revision of elft nasa lrecon  pt very happy results look excellent at this point in the postop process  Wound care instructions given. massage  Due to COVID 19, pre-visit patient instructions were explained to the patient and their symptoms were checked upon arrival.   Masks were used by the health care providers and staff and the examination room was cleaned after the patient visit was completed.  10/20/2022 left nasal and cheek scarring fine cont massage/scarguard  pt overall happy and sees improvement  all ?s answered  Due to COVID 19, pre-visit patient instructions were explained to the patient and their symptoms were checked upon arrival.   Masks were used by the health care providers and staff and the examination room was cleaned after the patient visit was completed.   2/16/2023 pt very happy w results of left nasal recon  swelling resolving cont massage  no issues  cont derm surveillance  f/u prn  Due to COVID 19, pre-visit patient instructions were explained to the patient and their symptoms were checked upon arrival.   Masks were used by the health care providers and staff and the examination room was cleaned after the patient visit was completed.  9/12/2024 prior left nasal recon 2021--pt happy seen w   recent dx right nasal SCC 8/2024 to have Mohs w Errol  here for recon  on exam well healing bx site along right alar riom  Regarding the reconstruction after Mohs surgery, we discussed scarring, risk of repeat procedure, poor wound healing, need for additional revisionary surgery,asymmetry, dissatisfaction with the outcome, and unplanned surgery in the future.  All questions were answered.  All risks were well understood by the patient.  Regarding the reconstruction after skin cancer  surgery, we discussed scarring, risk of repeat procedure, poor wound healing, need for additional revisionary surgery,asymmetry, dissatisfaction with the outcome, and unplanned surgery in the future.  All questions were answered.  All risks were well understood by the patient.

## 2024-09-13 ENCOUNTER — APPOINTMENT (OUTPATIENT)
Dept: CARDIOLOGY | Facility: CLINIC | Age: 79
End: 2024-09-13

## 2024-09-13 DIAGNOSIS — I49.9 CARDIAC ARRHYTHMIA, UNSPECIFIED: ICD-10-CM

## 2024-09-13 PROBLEM — I47.29 VENTRICULAR TACHYCARDIA (PAROXYSMAL): Status: ACTIVE | Noted: 2018-07-24

## 2024-09-13 PROBLEM — Z01.810 PRE-OPERATIVE CARDIOVASCULAR EXAMINATION: Status: ACTIVE | Noted: 2024-09-13

## 2024-09-13 PROCEDURE — 93306 TTE W/DOPPLER COMPLETE: CPT

## 2024-09-18 NOTE — CARDIOLOGY SUMMARY
[de-identified] : EKG 8/18/22 sinus jean pierre, 47 bpm, LAD, unhcnaged compared to 10/13/21 EKG 09/28/23 Sinus Bradycardya HR 53 bpm, no PVCs ELK 9/12/24: SB 57bpm, LAD

## 2024-09-18 NOTE — CARDIOLOGY SUMMARY
[de-identified] : EKG 8/18/22 sinus jean pierre, 47 bpm, LAD, unhcnaged compared to 10/13/21 EKG 09/28/23 Sinus Bradycardya HR 53 bpm, no PVCs ELK 9/12/24: SB 57bpm, LAD

## 2024-09-18 NOTE — ASSESSMENT
[FreeTextEntry1] : Assessment: #RVOT PVCs - EP study in 1994 - Per chart, patient has been stable on verapamil and not inducible with stress testing - Asymptomatic, no recent palpitations  #DLD - 4/7/22 , TG 72, HDL 94,  - 4/2024: , TG 89, ,  #Sinus bradycardia - Likely from verapamil, patient asymptomatic and able to get HR with exercise   Plan: - 2D ECHO - Continue verapamil 120 mg daily  - Short acting verapamil 40 mg PRN  PRN if she develops palpitations when not near a hospital   - Counseled patient on diet and exercise  - Labs with PCP - Return to clinic in 12 months or sooner PRN  Rahel Mina, DNP, FNP-BC

## 2024-09-18 NOTE — CARDIOLOGY SUMMARY
[de-identified] : EKG 8/18/22 sinus jean pierre, 47 bpm, LAD, unhcnaged compared to 10/13/21 EKG 09/28/23 Sinus Bradycardya HR 53 bpm, no PVCs ELK 9/12/24: SB 57bpm, LAD

## 2024-09-18 NOTE — ADDENDUM
[FreeTextEntry1] : Intermediate risk for cardiovascular complications related to colonoscopy May proceed with procedure at this time No SBE prophylaxis  Rahel Mina, DNP, FNP-BC

## 2024-09-18 NOTE — HISTORY OF PRESENT ILLNESS
[FreeTextEntry1] : 78F with RVOT PVCs, DLD here for follow-up.   9/12/24 with NP: Pt reports feeling well. Denies chest pain, SOB, palpitations, swelling, dizziness, or syncope. Remains active. Needs clearance for plastic surgery for squamous cell carcinoma and for colonoscopy. LDL increased from last year but pt states she was told by PCP and cardiologist that since HDL is high she does not need treatment.   9/28/23 Pt is seen for f.u.  Pt denies chest pain, no SOB, no palpitations, no edema.  Pt is very active.  Pt walks 2 to 5 miles  5 times a week.  Pt reports 2 episodes of extreme fatigue during her walk.  No syncopal episodes.   8/2022 No palpitations. No chest pain, shortness of breath, lightheadedness/dizziness, pre-syncope/syncope, or lower extremity edema. Smart watch, HR goes to  with exercise

## 2024-09-18 NOTE — REVIEW OF SYSTEMS
[Negative] : Respiratory [Feeling Fatigued] : not feeling fatigued [Abdominal Pain] : no abdominal pain [Rash] : no rash [Dizziness] : no dizziness

## 2024-10-18 ENCOUNTER — OUTPATIENT (OUTPATIENT)
Dept: OUTPATIENT SERVICES | Facility: HOSPITAL | Age: 79
LOS: 1 days | End: 2024-10-18
Payer: MEDICARE

## 2024-10-18 VITALS
TEMPERATURE: 99 F | SYSTOLIC BLOOD PRESSURE: 146 MMHG | RESPIRATION RATE: 19 BRPM | HEART RATE: 67 BPM | DIASTOLIC BLOOD PRESSURE: 72 MMHG | OXYGEN SATURATION: 97 % | HEIGHT: 62 IN | WEIGHT: 128.09 LBS

## 2024-10-18 DIAGNOSIS — Z90.49 ACQUIRED ABSENCE OF OTHER SPECIFIED PARTS OF DIGESTIVE TRACT: Chronic | ICD-10-CM

## 2024-10-18 DIAGNOSIS — Z98.890 OTHER SPECIFIED POSTPROCEDURAL STATES: Chronic | ICD-10-CM

## 2024-10-18 DIAGNOSIS — Z96.1 PRESENCE OF INTRAOCULAR LENS: Chronic | ICD-10-CM

## 2024-10-18 DIAGNOSIS — Z01.818 ENCOUNTER FOR OTHER PREPROCEDURAL EXAMINATION: ICD-10-CM

## 2024-10-18 DIAGNOSIS — C44.321 SQUAMOUS CELL CARCINOMA OF SKIN OF NOSE: ICD-10-CM

## 2024-10-18 DIAGNOSIS — Z98.891 HISTORY OF UTERINE SCAR FROM PREVIOUS SURGERY: Chronic | ICD-10-CM

## 2024-10-18 LAB
ALBUMIN SERPL ELPH-MCNC: 4.1 G/DL — SIGNIFICANT CHANGE UP (ref 3.5–5.2)
ALP SERPL-CCNC: 68 U/L — SIGNIFICANT CHANGE UP (ref 30–115)
ALT FLD-CCNC: 18 U/L — SIGNIFICANT CHANGE UP (ref 0–41)
ANION GAP SERPL CALC-SCNC: 13 MMOL/L — SIGNIFICANT CHANGE UP (ref 7–14)
AST SERPL-CCNC: 20 U/L — SIGNIFICANT CHANGE UP (ref 0–41)
BASOPHILS # BLD AUTO: 0.03 K/UL — SIGNIFICANT CHANGE UP (ref 0–0.2)
BASOPHILS NFR BLD AUTO: 0.6 % — SIGNIFICANT CHANGE UP (ref 0–1)
BILIRUB SERPL-MCNC: 1.1 MG/DL — SIGNIFICANT CHANGE UP (ref 0.2–1.2)
BUN SERPL-MCNC: 14 MG/DL — SIGNIFICANT CHANGE UP (ref 10–20)
CALCIUM SERPL-MCNC: 9.8 MG/DL — SIGNIFICANT CHANGE UP (ref 8.4–10.5)
CHLORIDE SERPL-SCNC: 103 MMOL/L — SIGNIFICANT CHANGE UP (ref 98–110)
CO2 SERPL-SCNC: 23 MMOL/L — SIGNIFICANT CHANGE UP (ref 17–32)
CREAT SERPL-MCNC: 0.8 MG/DL — SIGNIFICANT CHANGE UP (ref 0.7–1.5)
EGFR: 76 ML/MIN/1.73M2 — SIGNIFICANT CHANGE UP
EOSINOPHIL # BLD AUTO: 0.11 K/UL — SIGNIFICANT CHANGE UP (ref 0–0.7)
EOSINOPHIL NFR BLD AUTO: 2.1 % — SIGNIFICANT CHANGE UP (ref 0–8)
GLUCOSE SERPL-MCNC: 116 MG/DL — HIGH (ref 70–99)
HCT VFR BLD CALC: 40.7 % — SIGNIFICANT CHANGE UP (ref 37–47)
HGB BLD-MCNC: 12.8 G/DL — SIGNIFICANT CHANGE UP (ref 12–16)
IMM GRANULOCYTES NFR BLD AUTO: 0.2 % — SIGNIFICANT CHANGE UP (ref 0.1–0.3)
LYMPHOCYTES # BLD AUTO: 1.4 K/UL — SIGNIFICANT CHANGE UP (ref 1.2–3.4)
LYMPHOCYTES # BLD AUTO: 27 % — SIGNIFICANT CHANGE UP (ref 20.5–51.1)
MCHC RBC-ENTMCNC: 28.4 PG — SIGNIFICANT CHANGE UP (ref 27–31)
MCHC RBC-ENTMCNC: 31.4 G/DL — LOW (ref 32–37)
MCV RBC AUTO: 90.4 FL — SIGNIFICANT CHANGE UP (ref 81–99)
MONOCYTES # BLD AUTO: 0.32 K/UL — SIGNIFICANT CHANGE UP (ref 0.1–0.6)
MONOCYTES NFR BLD AUTO: 6.2 % — SIGNIFICANT CHANGE UP (ref 1.7–9.3)
NEUTROPHILS # BLD AUTO: 3.32 K/UL — SIGNIFICANT CHANGE UP (ref 1.4–6.5)
NEUTROPHILS NFR BLD AUTO: 63.9 % — SIGNIFICANT CHANGE UP (ref 42.2–75.2)
NRBC # BLD: 0 /100 WBCS — SIGNIFICANT CHANGE UP (ref 0–0)
PLATELET # BLD AUTO: 243 K/UL — SIGNIFICANT CHANGE UP (ref 130–400)
PMV BLD: 11.1 FL — HIGH (ref 7.4–10.4)
POTASSIUM SERPL-MCNC: 4.5 MMOL/L — SIGNIFICANT CHANGE UP (ref 3.5–5)
POTASSIUM SERPL-SCNC: 4.5 MMOL/L — SIGNIFICANT CHANGE UP (ref 3.5–5)
PROT SERPL-MCNC: 6.4 G/DL — SIGNIFICANT CHANGE UP (ref 6–8)
RBC # BLD: 4.5 M/UL — SIGNIFICANT CHANGE UP (ref 4.2–5.4)
RBC # FLD: 13.8 % — SIGNIFICANT CHANGE UP (ref 11.5–14.5)
SODIUM SERPL-SCNC: 139 MMOL/L — SIGNIFICANT CHANGE UP (ref 135–146)
WBC # BLD: 5.19 K/UL — SIGNIFICANT CHANGE UP (ref 4.8–10.8)
WBC # FLD AUTO: 5.19 K/UL — SIGNIFICANT CHANGE UP (ref 4.8–10.8)

## 2024-10-18 PROCEDURE — 36415 COLL VENOUS BLD VENIPUNCTURE: CPT

## 2024-10-18 PROCEDURE — 85025 COMPLETE CBC W/AUTO DIFF WBC: CPT

## 2024-10-18 PROCEDURE — 99214 OFFICE O/P EST MOD 30 MIN: CPT | Mod: 25

## 2024-10-18 PROCEDURE — 80053 COMPREHEN METABOLIC PANEL: CPT

## 2024-10-18 NOTE — H&P PST ADULT - HISTORY OF PRESENT ILLNESS
78 year old male presents for RIGHT NASAL RECONSTRUCTION WITH LOCAL FLAP OR FULL THICKNESS SKIN GRAFT FROM RIGHT OR LEFT FACE on 10/30/24 under general anesthesia.    Patient states she had a small lesion to right side of nose. She had a biopsy done. She is having MOHs surgery 10/29 and then will present for reconstruction. Patient has seen both her PMD and cardiologist recently. Pt has hx of SVT but has not had an episode 'in over 30 years."    PATIENT / GUARDIAN CURRENTLY DENIES CHEST PAIN  SHORTNESS OF BREATH  PALPITATIONS,  DYSURIA, OR UPPER RESPIRATORY INFECTION IN PAST 2 WEEKS  Patient / Guardian verbalized understanding of instructions and was given the opportunity to ask questions and have them answered.  As per patient, this is their complete medical and surgical history, including medications both prescribed or over the counter.  written and verbal instructions with teach back on chlorhexidine shampoo provided,  pt verbalized understanding with returned demonstration    Anesthesia Alert  NO--Difficult Airway  NO--History of neck surgery or radiation  NO--Limited ROM of neck  NO--History of Malignant hyperthermia  NO--Personal or family history of Pseudocholinesterase deficiency  NO--Prior Anesthesia Complication  NO--Latex Allergy  NO--Loose teeth  NO--History of Rheumatoid Arthritis  NO--JUSTYNA  NO--Bleeding risk  YES--Other_____  patient states sister has a hard time waking up from anesthesia. sister is obese and has sleep apnea.   Mallampati airway: Class IV     Revised Cardiac Risk Index for Pre-Operative Risk from [email or web address]  on 10/18/2024  ** All calculations should be rechecked by clinician prior to use **    RESULT SUMMARY:  0 points  Class I Risk    3.9 %  30-day risk of death, MI, or cardiac arrest    From Duceppe 2017. These numbers are higher than those from the original study (Tristan 1999). See Evidence for details.      INPUTS:  Elevated-risk surgery —> 0 = No  History of ischemic heart disease —> 0 = No  History of congestive heart failure —> 0 = No  History of cerebrovascular disease —> 0 = No  Pre-operative treatment with insulin —> 0 = No  Pre-operative creatinine >2 mg/dL / 176.8 µmol/L —> 0 = No    Duke Activity Status Index (DASI) from [employer]  on 10/18/2024  ** All calculations should be rechecked by clinician prior to use **    RESULT SUMMARY:  58.2 points  The higher the score (maximum 58.2), the higher the functional status.    9.89 METs        INPUTS:  Take care of self —> 2.75 = Yes  Walk indoors —> 1.75 = Yes  Walk 1&ndash;2 blocks on level ground —> 2.75 = Yes  Climb a flight of stairs or walk up a hill —> 5.5 = Yes  Run a short distance —> 8 = Yes  Do light work around the house —> 2.7 = Yes  Do moderate work around the house —> 3.5 = Yes  Do heavy work around the house —> 8 = Yes  Do yardwork —> 4.5 = Yes  Have sexual relations —> 5.25 = Yes  Participate in moderate recreational activities —> 6 = Yes  Participate in strenuous sports —> 7.5 = Yes

## 2024-10-18 NOTE — H&P PST ADULT - NSICDXPASTSURGICALHX_GEN_ALL_CORE_FT
PAST SURGICAL HISTORY:  H/O basal cell carcinoma excision     H/O squamous cell carcinoma excision     H/O:      History of cholecystectomy     History of intraocular lens implant b/l

## 2024-10-18 NOTE — H&P PST ADULT - REASON FOR ADMISSION
78 year old male presents for RIGHT NASAL RECONSTRUCTION WITH LOCAL FLAP OR FULL THICKNESS SKIN GRAFT FROM RIGHT OR LEFT FACE on 10/30/24 under general anesthesia

## 2024-10-18 NOTE — H&P PST ADULT - NS PRO FEM REPRO HEALTH SCREEN
mammogram
Patient seen today, feels well, one episode of vomiting overnight. Electrolytes wnl    Vital Signs Last 24 Hrs  T(C): 37.7 (06 Oct 2023 08:49), Max: 37.7 (06 Oct 2023 08:49)  T(F): 99.8 (06 Oct 2023 08:49), Max: 99.8 (06 Oct 2023 08:49)  HR: 62 (06 Oct 2023 08:49) (58 - 62)  BP: 129/65 (06 Oct 2023 08:49) (121/61 - 129/65)  RR: 18 (06 Oct 2023 08:49) (18 - 18)  SpO2: 100% (06 Oct 2023 08:49) (100% - 100%)    Parameters below as of 06 Oct 2023 08:49  Patient On (Oxygen Delivery Method): room air    PHYSICAL EXAM:  GENERAL: NAD, lying in bed comfortably  CHEST/LUNG: Clear to auscultation bilaterally; No rales, rhonchi, wheezing. Unlabored respirations  HEART: Regular rate and rhythm; No murmurs  ABDOMEN: Bowel sounds present; Soft, Nontender, Nondistended.   EXTREMITIES:  2+ Peripheral Pulses, brisk capillary refill. No clubbing, cyanosis, or edema  NERVOUS SYSTEM:  Alert & Oriented X3, speech clear. No deficits   MSK: FROM all 4 extremities, full and equal strength

## 2024-10-18 NOTE — H&P PST ADULT - NSICDXPASTMEDICALHX_GEN_ALL_CORE_FT
PAST MEDICAL HISTORY:  Basal cell carcinoma     IBS (irritable bowel syndrome)     Skin cancer     SVT (supraventricular tachycardia)

## 2024-10-19 DIAGNOSIS — Z01.818 ENCOUNTER FOR OTHER PREPROCEDURAL EXAMINATION: ICD-10-CM

## 2024-10-19 DIAGNOSIS — C44.321 SQUAMOUS CELL CARCINOMA OF SKIN OF NOSE: ICD-10-CM

## 2024-10-22 ENCOUNTER — TRANSCRIPTION ENCOUNTER (OUTPATIENT)
Age: 79
End: 2024-10-22

## 2024-10-22 ENCOUNTER — OUTPATIENT (OUTPATIENT)
Dept: OUTPATIENT SERVICES | Facility: HOSPITAL | Age: 79
LOS: 1 days | Discharge: HOME | End: 2024-10-22
Payer: MEDICARE

## 2024-10-22 VITALS
RESPIRATION RATE: 18 BRPM | OXYGEN SATURATION: 99 % | WEIGHT: 119.05 LBS | DIASTOLIC BLOOD PRESSURE: 68 MMHG | TEMPERATURE: 97 F | SYSTOLIC BLOOD PRESSURE: 137 MMHG | HEIGHT: 62 IN | HEART RATE: 65 BPM

## 2024-10-22 VITALS
RESPIRATION RATE: 18 BRPM | SYSTOLIC BLOOD PRESSURE: 135 MMHG | DIASTOLIC BLOOD PRESSURE: 63 MMHG | OXYGEN SATURATION: 99 % | HEART RATE: 68 BPM

## 2024-10-22 DIAGNOSIS — Z98.891 HISTORY OF UTERINE SCAR FROM PREVIOUS SURGERY: Chronic | ICD-10-CM

## 2024-10-22 DIAGNOSIS — Z98.890 OTHER SPECIFIED POSTPROCEDURAL STATES: Chronic | ICD-10-CM

## 2024-10-22 DIAGNOSIS — Z96.1 PRESENCE OF INTRAOCULAR LENS: Chronic | ICD-10-CM

## 2024-10-22 DIAGNOSIS — Z12.11 ENCOUNTER FOR SCREENING FOR MALIGNANT NEOPLASM OF COLON: ICD-10-CM

## 2024-10-22 DIAGNOSIS — K56.609 UNSPECIFIED INTESTINAL OBSTRUCTION, UNSPECIFIED AS TO PARTIAL VERSUS COMPLETE OBSTRUCTION: Chronic | ICD-10-CM

## 2024-10-22 DIAGNOSIS — Z90.49 ACQUIRED ABSENCE OF OTHER SPECIFIED PARTS OF DIGESTIVE TRACT: Chronic | ICD-10-CM

## 2024-10-22 PROCEDURE — 88305 TISSUE EXAM BY PATHOLOGIST: CPT

## 2024-10-22 PROCEDURE — 88305 TISSUE EXAM BY PATHOLOGIST: CPT | Mod: 26

## 2024-10-22 NOTE — CHART NOTE - NSCHARTNOTEFT_GEN_A_CORE
PACU ANESTHESIA ADMISSION NOTE      Procedure:   Post op diagnosis:      ____  Intubated  TV:______       Rate: ______      FiO2: ______    _x___  Patent Airway    _x___  Full return of protective reflexes    _x___  Full recovery from anesthesia / back to baseline status    Vitals:  T(C): 36.4 (10-22-24 @ 12:47), Max: 36.4 (10-22-24 @ 12:47)  HR: 68 (10-22-24 @ 13:12) (54 - 68)  BP: 135/63 (10-22-24 @ 13:12) (121/60 - 137/68)  RR: 18 (10-22-24 @ 13:12) (18 - 18)  SpO2: 99% (10-22-24 @ 13:12) (99% - 99%)    Mental Status:  _x___ Awake   _____ Alert   _____ Drowsy   _____ Sedated    Nausea/Vomiting:  _x___  NO       ______Yes,   See Post - Op Orders         Pain Scale (0-10):  __0___    Treatment: _x___ None    ____ See Post - Op/PCA Orders    Post - Operative Fluids:   __x__ Oral   ____ See Post - Op Orders    Plan: Discharge:   _x___Home       _____Floor     _____Critical Care    _____  Other:_________________    Comments:  No anesthesia issues or complications noted.  Discharge when criteria met.

## 2024-10-22 NOTE — ASU PATIENT PROFILE, ADULT - FALL HARM RISK - UNIVERSAL INTERVENTIONS
Bed in lowest position, wheels locked, appropriate side rails in place/Call bell, personal items and telephone in reach/Instruct patient to call for assistance before getting out of bed or chair/Non-slip footwear when patient is out of bed/[place] to call system/Physically safe environment - no spills, clutter or unnecessary equipment/Purposeful Proactive Rounding/Room/bathroom lighting operational, light cord in reach

## 2024-10-22 NOTE — ASU DISCHARGE PLAN (ADULT/PEDIATRIC) - CARE PROVIDER_API CALL
Celine Hawkins  Gastroenterology  [address], [address]  [place] [address]  Phone: [phone]  Fax: [phone]  Follow Up Time: 2 weeks

## 2024-10-22 NOTE — ASU PATIENT PROFILE, ADULT - NSICDXPASTSURGICALHX_GEN_ALL_CORE_FT
PAST SURGICAL HISTORY:  Bowel obstruction scar tissue removal    H/O basal cell carcinoma excision     H/O squamous cell carcinoma excision     H/O:      History of cholecystectomy     History of intraocular lens implant b/l

## 2024-10-22 NOTE — ASU DISCHARGE PLAN (ADULT/PEDIATRIC) - FINANCIAL ASSISTANCE
[facility] provides services at a reduced cost to those who are determined to be eligible through [facility]’s financial assistance program. Information regarding [facility]’s financial assistance program can be found by going to [email or web address] or by calling [phone].

## 2024-10-23 LAB — SURGICAL PATHOLOGY STUDY: SIGNIFICANT CHANGE UP

## 2024-10-25 DIAGNOSIS — R19.4 CHANGE IN BOWEL HABIT: ICD-10-CM

## 2024-10-25 DIAGNOSIS — Z88.2 ALLERGY STATUS TO SULFONAMIDES: ICD-10-CM

## 2024-10-25 DIAGNOSIS — K64.1 SECOND DEGREE HEMORRHOIDS: ICD-10-CM

## 2024-10-25 DIAGNOSIS — I47.10 SUPRAVENTRICULAR TACHYCARDIA, UNSPECIFIED: ICD-10-CM

## 2024-10-25 DIAGNOSIS — K63.5 POLYP OF COLON: ICD-10-CM

## 2024-10-25 DIAGNOSIS — K57.30 DIVERTICULOSIS OF LARGE INTESTINE WITHOUT PERFORATION OR ABSCESS WITHOUT BLEEDING: ICD-10-CM

## 2024-10-30 ENCOUNTER — APPOINTMENT (OUTPATIENT)
Dept: PLASTIC SURGERY | Facility: AMBULATORY SURGERY CENTER | Age: 79
End: 2024-10-30
Payer: MEDICARE

## 2024-10-30 ENCOUNTER — OUTPATIENT (OUTPATIENT)
Dept: OUTPATIENT SERVICES | Facility: HOSPITAL | Age: 79
LOS: 1 days | Discharge: HOME | End: 2024-10-30
Payer: MEDICARE

## 2024-10-30 ENCOUNTER — TRANSCRIPTION ENCOUNTER (OUTPATIENT)
Age: 79
End: 2024-10-30

## 2024-10-30 VITALS
SYSTOLIC BLOOD PRESSURE: 141 MMHG | RESPIRATION RATE: 24 BRPM | DIASTOLIC BLOOD PRESSURE: 65 MMHG | HEART RATE: 57 BPM | OXYGEN SATURATION: 97 %

## 2024-10-30 VITALS
SYSTOLIC BLOOD PRESSURE: 134 MMHG | DIASTOLIC BLOOD PRESSURE: 52 MMHG | WEIGHT: 128.09 LBS | TEMPERATURE: 98 F | HEIGHT: 62 IN | HEART RATE: 50 BPM | RESPIRATION RATE: 19 BRPM | OXYGEN SATURATION: 99 %

## 2024-10-30 DIAGNOSIS — C44.321 SQUAMOUS CELL CARCINOMA OF SKIN OF NOSE: ICD-10-CM

## 2024-10-30 DIAGNOSIS — K56.609 UNSPECIFIED INTESTINAL OBSTRUCTION, UNSPECIFIED AS TO PARTIAL VERSUS COMPLETE OBSTRUCTION: Chronic | ICD-10-CM

## 2024-10-30 DIAGNOSIS — Z98.890 OTHER SPECIFIED POSTPROCEDURAL STATES: Chronic | ICD-10-CM

## 2024-10-30 DIAGNOSIS — Z96.1 PRESENCE OF INTRAOCULAR LENS: Chronic | ICD-10-CM

## 2024-10-30 DIAGNOSIS — Z90.49 ACQUIRED ABSENCE OF OTHER SPECIFIED PARTS OF DIGESTIVE TRACT: Chronic | ICD-10-CM

## 2024-10-30 DIAGNOSIS — Z98.891 HISTORY OF UTERINE SCAR FROM PREVIOUS SURGERY: Chronic | ICD-10-CM

## 2024-10-30 PROCEDURE — 14060 TIS TRNFR E/N/E/L 10 SQ CM/<: CPT

## 2024-10-30 PROCEDURE — C9399: CPT

## 2024-10-30 RX ORDER — MINOXIDIL 2.5 MG/1
0.5 TABLET ORAL
Refills: 0 | DISCHARGE

## 2024-10-30 RX ORDER — FINASTERIDE 5 MG/1
1 TABLET, FILM COATED ORAL
Refills: 0 | DISCHARGE

## 2024-10-30 RX ORDER — DOXYCYCLINE HYCLATE 100 MG/1
1 TABLET, FILM COATED ORAL
Qty: 10 | Refills: 0
Start: 2024-10-30 | End: 2024-11-03

## 2024-10-30 RX ORDER — ONDANSETRON HYDROCHLORIDE 2 MG/ML
4 INJECTION, SOLUTION INTRAMUSCULAR; INTRAVENOUS ONCE
Refills: 0 | Status: DISCONTINUED | OUTPATIENT
Start: 2024-10-30 | End: 2024-10-30

## 2024-10-30 RX ORDER — HYDROMORPHONE HCL/0.9% NACL/PF 6 MG/30 ML
0.5 PATIENT CONTROLLED ANALGESIA SYRINGE INTRAVENOUS
Refills: 0 | Status: DISCONTINUED | OUTPATIENT
Start: 2024-10-30 | End: 2024-10-30

## 2024-10-30 RX ORDER — CHLORDIAZEPOXIDE/CLIDINIUM BR 5 MG-2.5MG
2 CAPSULE ORAL
Refills: 0 | DISCHARGE

## 2024-10-30 RX ADMIN — Medication 75 MILLILITER(S): at 12:01

## 2024-10-30 NOTE — ASU DISCHARGE PLAN (ADULT/PEDIATRIC) - FINANCIAL ASSISTANCE
[facility] provides services at a reduced cost to those who are determined to be eligible through [facility]’s financial assistance program. Information regarding [facility]’s financial assistance program can be found by going to [email or web address] or by calling [phone].

## 2024-10-30 NOTE — ASU DISCHARGE PLAN (ADULT/PEDIATRIC) - CARE PROVIDER_API CALL
Joel Styles  Plastic Surgery  [address] [place] [id number]  Phone: [phone]  Fax: [phone]  Follow Up Time: 1 week

## 2024-10-30 NOTE — ASU DISCHARGE PLAN (ADULT/PEDIATRIC) - FOLLOW UP APPOINTMENTS
[facility],  Endoscopy/Ambulatory Surgery [facility]:  Center for Amb[facility]

## 2024-10-30 NOTE — ASU DISCHARGE PLAN (ADULT/PEDIATRIC) - ASU DC SPECIAL INSTRUCTIONSFT
Keep dressings dry and intact until follow up in clinic   Begin showers tomorrow avoid tub bathing and excessive submerging in water   Take tylenol and tramadol as directed   Antibiotics prescribed take as directed Keep dressings dry and intact until follow up in clinic   Begin showers tomorrow avoid tub bathing and excessive submerging in water   Take Tylenol and tramadol as directed   Antibiotics prescribed take as directed Keep dressings dry and intact until follow up in clinic   Take Tylenol 1000mg every 6 hours for pain.  Antibiotics prescribed take as directed -- 5 days of doxycycline.   if your dressing falls off please apply bacitracin twice a day to the incision and cover with a gauze and tape.

## 2024-10-30 NOTE — CHART NOTE - NSCHARTNOTEFT_GEN_A_CORE
PACU ANESTHESIA ADMISSION NOTE      Procedure: Adjacent tissue transfer of nose, defect size 5.1 to 10.0 sq cm      Post op diagnosis:      ____  Intubated  TV:______       Rate: ______      FiO2: ______    __x__  Patent Airway    __x__  Full return of protective reflexes    __x__  Full recovery from anesthesia / back to baseline     Vitals:   T: 37          R: 15                 BP: 183/77, 173/79                 Sat: 97                  P: 68      Mental Status:  __x__ Awake   ___x__ Alert   _____ Drowsy   _____ Sedated    Nausea/Vomiting:  _x___ NO  ______Yes,   See Post - Op Orders          Pain Scale (0-10):  _____    Treatment: __x__ None    ____ See Post - Op/PCA Orders    Post - Operative Fluids:   ____ Oral   ___x_ See Post - Op Orders    Plan: Discharge:   __x__Home       _____Floor     _____Critical Care    _____  Other:_________________    Comments:    Uneventful anesthesia. Patient transported to  spontaneously breathing and hemodynamically stable.

## 2024-10-30 NOTE — ASU DISCHARGE PLAN (ADULT/PEDIATRIC) - ***IN THE EVENT THAT YOU DEVELOP A COMPLICATION AND YOU ARE UNABLE TO REACH YOUR OWN PHYSICIAN, YOU MAY CONTACT:
Statement Selected What Type Of Note Output Would You Prefer (Optional)?: Standard Output Hpi Title: Evaluation of Skin Lesions How Severe Are Your Spot(S)?: moderate Have Your Spot(S) Been Treated In The Past?: has not been treated

## 2024-10-30 NOTE — BRIEF OPERATIVE NOTE - NSICDXBRIEFPROCEDURE_GEN_ALL_CORE_FT
PROCEDURES:  Adjacent tissue transfer of nose, defect size 5.1 to 10.0 sq cm 30-Oct-2024 11:19:19  Tad Bonds

## 2024-10-31 ENCOUNTER — APPOINTMENT (OUTPATIENT)
Dept: CARDIOLOGY | Facility: CLINIC | Age: 79
End: 2024-10-31

## 2024-10-31 PROBLEM — K58.9 IRRITABLE BOWEL SYNDROME, UNSPECIFIED: Chronic | Status: ACTIVE | Noted: 2024-10-18

## 2024-10-31 PROBLEM — C44.91 BASAL CELL CARCINOMA OF SKIN, UNSPECIFIED: Chronic | Status: ACTIVE | Noted: 2024-10-18

## 2024-11-06 ENCOUNTER — APPOINTMENT (OUTPATIENT)
Dept: PLASTIC SURGERY | Facility: CLINIC | Age: 79
End: 2024-11-06
Payer: MEDICARE

## 2024-11-06 DIAGNOSIS — C44.321 SQUAMOUS CELL CARCINOMA OF SKIN OF NOSE: ICD-10-CM

## 2024-11-06 PROBLEM — C44.90 UNSPECIFIED MALIGNANT NEOPLASM OF SKIN, UNSPECIFIED: Chronic | Status: ACTIVE | Noted: 2024-10-18

## 2024-11-06 PROCEDURE — 99024 POSTOP FOLLOW-UP VISIT: CPT

## 2024-11-07 DIAGNOSIS — K58.9 IRRITABLE BOWEL SYNDROME, UNSPECIFIED: ICD-10-CM

## 2024-11-07 DIAGNOSIS — C44.321 SQUAMOUS CELL CARCINOMA OF SKIN OF NOSE: ICD-10-CM

## 2024-11-07 DIAGNOSIS — Z88.2 ALLERGY STATUS TO SULFONAMIDES: ICD-10-CM

## 2024-11-07 DIAGNOSIS — I47.10 SUPRAVENTRICULAR TACHYCARDIA, UNSPECIFIED: ICD-10-CM

## 2024-12-03 ENCOUNTER — APPOINTMENT (OUTPATIENT)
Dept: PLASTIC SURGERY | Facility: CLINIC | Age: 79
End: 2024-12-03

## 2024-12-03 PROCEDURE — 99024 POSTOP FOLLOW-UP VISIT: CPT

## 2024-12-09 ENCOUNTER — APPOINTMENT (OUTPATIENT)
Dept: OBGYN | Facility: CLINIC | Age: 79
End: 2024-12-09

## 2024-12-13 ENCOUNTER — RESULT REVIEW (OUTPATIENT)
Age: 79
End: 2024-12-13

## 2024-12-13 ENCOUNTER — OUTPATIENT (OUTPATIENT)
Dept: OUTPATIENT SERVICES | Facility: HOSPITAL | Age: 79
LOS: 1 days | End: 2024-12-13
Payer: MEDICARE

## 2024-12-13 DIAGNOSIS — Z96.1 PRESENCE OF INTRAOCULAR LENS: Chronic | ICD-10-CM

## 2024-12-13 DIAGNOSIS — Z12.31 ENCOUNTER FOR SCREENING MAMMOGRAM FOR MALIGNANT NEOPLASM OF BREAST: ICD-10-CM

## 2024-12-13 DIAGNOSIS — Z98.890 OTHER SPECIFIED POSTPROCEDURAL STATES: Chronic | ICD-10-CM

## 2024-12-13 DIAGNOSIS — K56.609 UNSPECIFIED INTESTINAL OBSTRUCTION, UNSPECIFIED AS TO PARTIAL VERSUS COMPLETE OBSTRUCTION: Chronic | ICD-10-CM

## 2024-12-13 DIAGNOSIS — Z98.891 HISTORY OF UTERINE SCAR FROM PREVIOUS SURGERY: Chronic | ICD-10-CM

## 2024-12-13 DIAGNOSIS — Z90.49 ACQUIRED ABSENCE OF OTHER SPECIFIED PARTS OF DIGESTIVE TRACT: Chronic | ICD-10-CM

## 2024-12-13 PROCEDURE — 77067 SCR MAMMO BI INCL CAD: CPT

## 2024-12-13 PROCEDURE — 77063 BREAST TOMOSYNTHESIS BI: CPT

## 2024-12-13 PROCEDURE — 77063 BREAST TOMOSYNTHESIS BI: CPT | Mod: 26

## 2024-12-13 PROCEDURE — 77067 SCR MAMMO BI INCL CAD: CPT | Mod: 26

## 2024-12-14 DIAGNOSIS — Z12.31 ENCOUNTER FOR SCREENING MAMMOGRAM FOR MALIGNANT NEOPLASM OF BREAST: ICD-10-CM

## 2024-12-30 NOTE — ASU PATIENT PROFILE, ADULT - TEACHING/LEARNING FACTORS IMPACT ABILITY TO LEARN
Nutrition Assessment  Assessment Type: Initial  Reason for visit:  NPO or Clear Liquid  Malnutrition Screening Tool Score: 0    Nutrition Intervention:   Food and/or Nutrient Delivery:   Meals and Snacks:  Diet:  Continue clears per provider.   Medical Food Supplements:   Medical food supplement therapy:  Initiate Ensure Clear (clear liquid oral supplement) 240 calories, 8 grams protein per 8 ounce serving  NPO/CLD day 5, if pt is to remain NPO/CLD greater than 3 additional days, nutrition support should be considered for primary needs.  Elevated ammonia will need to be treated prior to implementation of nutrition support.      Malnutrition Assessment:  Malnutrition Status: Insufficient data  Nutrition Focused Physical Exam: Deferred due to pt sleeping s/p haldol and code violet    Nutrition Assessment:  Food/Nutrition Related History:   Unable to obtain from pt, h&p indicates pt ate minimally on 12/23 and was unable to tolerate anything on 12/24 at presentation.          Weight History:   Wt hx per EMR review:   [facility] transplant clinic: 171# 11/24, 166# 2/23.    Nutrition Background:       PMH remarkable for graves disease, cirrhosis, s/p TIPS, hx of abdominal surgeries, prior SBO.  Presented with abdominal pain, NV.      Admitted with SBO.    Nutrition Monitoring/Evaluation:  NPO.CLD day 5, +SBFT 12/30.    D5 and 0.45 NaCL with 40 meq KCl @ 75 ml/hr started 12/27.      +mineral oil enema 12/28 and 12/29.    +xifaxin  Ammonia 265 12/30/24  Sitter/PCT at bedside.  Notes pt previously accepting some clears no data recorded.    Current Nutrition Therapies:  ADULT DIET; Clear Liquid    Current Intake:   Average Meal Intake: Unable to assess Average Supplements Intake: None Ordered      Anthropometric Measures:  Height: 182.9 cm (6' 0.01\")  Current Body Wt: 77.1 kg (169 lb 15.6 oz), Weight source: Stated  BMI: 23, Normal Weight (BMI 18.5-24.9)  Admission Body Weight: 77.1 kg (169 lb 15.6 oz) (stated)  Ideal Body Weight  none

## 2025-01-08 ENCOUNTER — APPOINTMENT (OUTPATIENT)
Dept: OBGYN | Facility: CLINIC | Age: 80
End: 2025-01-08
Payer: MEDICARE

## 2025-01-08 VITALS — WEIGHT: 123 LBS | BODY MASS INDEX: 23.22 KG/M2 | HEIGHT: 61 IN

## 2025-01-08 DIAGNOSIS — N95.2 POSTMENOPAUSAL ATROPHIC VAGINITIS: ICD-10-CM

## 2025-01-08 LAB
APPEARANCE: CLEAR
BILIRUBIN URINE: NEGATIVE
BLOOD URINE: NEGATIVE
COLOR: YELLOW
GLUCOSE QUALITATIVE U: NEGATIVE
KETONES URINE: NEGATIVE
LEUKOCYTE ESTERASE URINE: ABNORMAL
NITRITE URINE: NEGATIVE
PH URINE: 6
PROTEIN URINE: NEGATIVE
SPECIFIC GRAVITY URINE: 1.02
UROBILINOGEN URINE: 0.2 (ref 0.2–?)

## 2025-01-08 PROCEDURE — 99213 OFFICE O/P EST LOW 20 MIN: CPT

## 2025-01-08 PROCEDURE — 77080 DXA BONE DENSITY AXIAL: CPT

## 2025-01-10 LAB — HPV HIGH+LOW RISK DNA PNL CVX: NOT DETECTED

## 2025-01-16 LAB — CYTOLOGY CVX/VAG DOC THIN PREP: ABNORMAL

## 2025-04-28 ENCOUNTER — NON-APPOINTMENT (OUTPATIENT)
Age: 80
End: 2025-04-28

## 2025-04-29 ENCOUNTER — APPOINTMENT (OUTPATIENT)
Dept: PLASTIC SURGERY | Facility: CLINIC | Age: 80
End: 2025-04-29
Payer: MEDICARE

## 2025-04-29 PROCEDURE — G2211 COMPLEX E/M VISIT ADD ON: CPT

## 2025-04-29 PROCEDURE — 99212 OFFICE O/P EST SF 10 MIN: CPT

## 2025-07-12 ENCOUNTER — NON-APPOINTMENT (OUTPATIENT)
Age: 80
End: 2025-07-12

## 2025-07-14 ENCOUNTER — APPOINTMENT (OUTPATIENT)
Dept: OTOLARYNGOLOGY | Facility: CLINIC | Age: 80
End: 2025-07-14
Payer: MEDICARE

## 2025-07-14 VITALS — BODY MASS INDEX: 23.03 KG/M2 | WEIGHT: 122 LBS | HEIGHT: 61 IN

## 2025-07-14 PROBLEM — H93.8X3 CLOGGED EAR, BILATERAL: Status: ACTIVE | Noted: 2025-07-14

## 2025-07-14 PROCEDURE — 92557 COMPREHENSIVE HEARING TEST: CPT

## 2025-07-14 PROCEDURE — 92550 TYMPANOMETRY & REFLEX THRESH: CPT | Mod: 52

## 2025-07-14 PROCEDURE — 99204 OFFICE O/P NEW MOD 45 MIN: CPT

## 2025-07-14 RX ORDER — FLUOCINOLONE ACETONIDE 0.11 MG/ML
0.01 OIL AURICULAR (OTIC)
Qty: 1 | Refills: 3 | Status: ACTIVE | COMMUNITY
Start: 2025-07-14 | End: 1900-01-01

## 2025-07-30 ENCOUNTER — APPOINTMENT (OUTPATIENT)
Dept: OTOLARYNGOLOGY | Facility: CLINIC | Age: 80
End: 2025-07-30
Payer: MEDICARE

## 2025-07-30 VITALS — WEIGHT: 122 LBS | BODY MASS INDEX: 23.03 KG/M2 | HEIGHT: 61 IN

## 2025-07-30 DIAGNOSIS — L29.9 PRURITUS, UNSPECIFIED: ICD-10-CM

## 2025-07-30 PROCEDURE — 99214 OFFICE O/P EST MOD 30 MIN: CPT

## 2025-08-12 ENCOUNTER — APPOINTMENT (OUTPATIENT)
Dept: OTOLARYNGOLOGY | Facility: CLINIC | Age: 80
End: 2025-08-12
Payer: MEDICARE

## 2025-08-12 DIAGNOSIS — H90.11 CONDUCTIVE HEARING LOSS, UNILATERAL, RIGHT EAR, WITH UNRESTRICTED HEARING ON THE CONTRALATERAL SIDE: ICD-10-CM

## 2025-08-12 PROCEDURE — 92504 EAR MICROSCOPY EXAMINATION: CPT

## 2025-08-12 PROCEDURE — 99214 OFFICE O/P EST MOD 30 MIN: CPT

## 2025-09-16 ENCOUNTER — APPOINTMENT (OUTPATIENT)
Dept: PLASTIC SURGERY | Facility: CLINIC | Age: 80
End: 2025-09-16
Payer: MEDICARE

## 2025-09-16 PROCEDURE — 99213 OFFICE O/P EST LOW 20 MIN: CPT

## 2025-09-16 PROCEDURE — G2211 COMPLEX E/M VISIT ADD ON: CPT
